# Patient Record
Sex: FEMALE | Race: WHITE | NOT HISPANIC OR LATINO | URBAN - METROPOLITAN AREA
[De-identification: names, ages, dates, MRNs, and addresses within clinical notes are randomized per-mention and may not be internally consistent; named-entity substitution may affect disease eponyms.]

---

## 2018-04-23 PROBLEM — J45.909 ASTHMA: Status: ACTIVE | Noted: 2017-02-01

## 2018-04-23 RX ORDER — SUMATRIPTAN SUCCINATE 50 MG/1
50 TABLET ORAL DAILY
COMMUNITY
Start: 2017-02-01 | End: 2022-08-30

## 2018-04-24 ENCOUNTER — OFFICE VISIT (OUTPATIENT)
Dept: FAMILY MEDICINE | Facility: CLINIC | Age: 28
End: 2018-04-24
Payer: COMMERCIAL

## 2018-04-24 VITALS
OXYGEN SATURATION: 99 % | SYSTOLIC BLOOD PRESSURE: 127 MMHG | TEMPERATURE: 98.5 F | WEIGHT: 197 LBS | HEART RATE: 91 BPM | BODY MASS INDEX: 33.63 KG/M2 | DIASTOLIC BLOOD PRESSURE: 93 MMHG | HEIGHT: 64 IN

## 2018-04-24 DIAGNOSIS — G43.909 MIGRAINE WITHOUT STATUS MIGRAINOSUS, NOT INTRACTABLE, UNSPECIFIED MIGRAINE TYPE: Primary | ICD-10-CM

## 2018-04-24 PROCEDURE — 99213 OFFICE O/P EST LOW 20 MIN: CPT | Performed by: NURSE PRACTITIONER

## 2018-04-24 ASSESSMENT — ENCOUNTER SYMPTOMS
LIGHT-HEADEDNESS: 0
DIZZINESS: 0
SEIZURES: 0
WEAKNESS: 0
NAUSEA: 0
SINUS PRESSURE: 0
ARTHRALGIAS: 0
MYALGIAS: 0
NERVOUS/ANXIOUS: 0
PHOTOPHOBIA: 0
EYE PAIN: 0
APPETITE CHANGE: 0
SINUS PAIN: 0
HEADACHES: 1
NUMBNESS: 0
VOMITING: 0
UNEXPECTED WEIGHT CHANGE: 0
SPEECH DIFFICULTY: 0
FEVER: 0
CONFUSION: 0
FATIGUE: 0
SLEEP DISTURBANCE: 0
JOINT SWELLING: 0

## 2018-04-24 NOTE — ASSESSMENT & PLAN NOTE
- Sent to Neurology for daily maintenance/preventive migraine medication. She will call to schedule.   - Adequate nutrition, exercise, hydration and sleep schedule.

## 2018-04-24 NOTE — PROGRESS NOTES
Subjective      Patient ID: Robyn Mariscal is a 27 y.o. female.    Pt presents with c/o migraine headaches. Was last seen over a year ago in the office 2017 for same complaint. She feels as if she has always suffered from migraines and headaches in general but for some reason over the last couple weeks to months they have progressively worsened, occurring more frequently. She was getting headaches once/week, now 4/7 days of the week. She has tried Imitrex but this medication makes her feel very funny, heart races. The only thing that does take the edge off somewhat is OTC Excedrine migraine product (but doesn't take the discomfort away completely, still lingers). When she does have a migraine +nausea, vomiting. No dizziness or any other neurologic deficit. She used to take Rx Topamax daily for years in high school and college but has been off that medication since having her 4 children. Eye exams UTD, no change in vision.          Past Medical History:   Diagnosis Date   • Anemia    • Asthma    • Migraines      Past Surgical History:   Procedure Laterality Date   •  SECTION       History reviewed. No pertinent family history.  Social History     Social History   • Marital status:      Spouse name: N/A   • Number of children: N/A   • Years of education: N/A     Occupational History   • Not on file.     Social History Main Topics   • Smoking status: Never Smoker   • Smokeless tobacco: Never Used   • Alcohol use Yes      Comment: occasionally   • Drug use: No   • Sexual activity: Not on file     Other Topics Concern   • Not on file     Social History Narrative    Do you wear your seatbelt? Yes    Do you have smoke detector in your home? Yes    Do you have a carbon monoxide detector in your home? Yes    Current Occupation?     Current Marital Status?            The following have been reviewed and updated as appropriate in this visit:  Tobacco  Allergies  Meds  Problems  Med Hx   "Surg Hx  Fam Hx  Soc Hx        Review of Systems   Constitutional: Negative for appetite change, fatigue, fever and unexpected weight change.   HENT: Negative for congestion, hearing loss, sinus pain and sinus pressure.    Eyes: Negative for photophobia, pain and visual disturbance.   Gastrointestinal: Negative for nausea and vomiting.   Genitourinary: Negative for menstrual problem.   Musculoskeletal: Negative for arthralgias, gait problem, joint swelling and myalgias.   Neurological: Positive for headaches. Negative for dizziness, seizures, syncope, speech difficulty, weakness, light-headedness and numbness.   Psychiatric/Behavioral: Negative for confusion and sleep disturbance. The patient is not nervous/anxious.        Objective     Vitals:    04/24/18 1214   BP: (!) 127/93   BP Location: Left upper arm   Patient Position: Sitting   Pulse: 91   Temp: 36.9 °C (98.5 °F)   TempSrc: Oral   SpO2: 99%   Weight: 89.4 kg (197 lb)   Height: 1.626 m (5' 4\")     Body mass index is 33.81 kg/m².    Physical Exam   Constitutional: She is oriented to person, place, and time. She appears well-developed and well-nourished. No distress.   HENT:   Head: Normocephalic and atraumatic.   Eyes: Conjunctivae and EOM are normal. Pupils are equal, round, and reactive to light.   Neurological: She is alert and oriented to person, place, and time. She displays normal reflexes. No cranial nerve deficit.   Skin: Skin is warm and dry. Capillary refill takes less than 2 seconds.   Psychiatric: She has a normal mood and affect. Her behavior is normal. Judgment and thought content normal.       Assessment/Plan   Problem List Items Addressed This Visit     Migraine without status migrainosus, not intractable - Primary     - Sent to Neurology for daily maintenance/preventive migraine medication. She will call to schedule.   - Adequate nutrition, exercise, hydration and sleep schedule.               "

## 2019-07-02 ENCOUNTER — OFFICE VISIT (OUTPATIENT)
Dept: FAMILY MEDICINE | Facility: CLINIC | Age: 29
End: 2019-07-02
Payer: COMMERCIAL

## 2019-07-02 VITALS
HEART RATE: 70 BPM | SYSTOLIC BLOOD PRESSURE: 123 MMHG | BODY MASS INDEX: 33.12 KG/M2 | OXYGEN SATURATION: 99 % | HEIGHT: 64 IN | TEMPERATURE: 98.5 F | DIASTOLIC BLOOD PRESSURE: 81 MMHG | WEIGHT: 194 LBS

## 2019-07-02 DIAGNOSIS — H92.01 RIGHT EAR PAIN: ICD-10-CM

## 2019-07-02 DIAGNOSIS — H91.91 HEARING LOSS OF RIGHT EAR, UNSPECIFIED HEARING LOSS TYPE: Primary | ICD-10-CM

## 2019-07-02 PROCEDURE — 99213 OFFICE O/P EST LOW 20 MIN: CPT | Performed by: NURSE PRACTITIONER

## 2019-07-02 RX ORDER — TOPIRAMATE 25 MG/1
25 TABLET ORAL 2 TIMES DAILY
COMMUNITY
End: 2023-04-04 | Stop reason: SDUPTHER

## 2019-07-02 RX ORDER — AMOXICILLIN AND CLAVULANATE POTASSIUM 875; 125 MG/1; MG/1
1 TABLET, FILM COATED ORAL 2 TIMES DAILY
Qty: 20 TABLET | Refills: 0 | Status: SHIPPED | OUTPATIENT
Start: 2019-07-02 | End: 2019-07-12

## 2019-07-02 ASSESSMENT — ENCOUNTER SYMPTOMS
CHILLS: 0
COUGH: 0
FATIGUE: 0
ACTIVITY CHANGE: 0
RHINORRHEA: 0
FEVER: 0
APPETITE CHANGE: 0
SINUS PRESSURE: 0
SORE THROAT: 0
HEADACHES: 0

## 2019-07-02 NOTE — PROGRESS NOTES
"   [place] [address]  [phone]       Reason for visit:   Chief Complaint   Patient presents with   • Earache / Otalgia      HPI   Robyn Mariscal is a 29 y.o. female who presents with hearingg loss, and discomfort right side. Started Friday - feels like she's \"under water\", muffled hearing. She does also note slight \"twinge of pain\" but hearing loss is bothering her more than the discomfort. She was battling with her typical allergy symptoms last week + runny nose, stuffy nose, dry cough. Those symptoms have resolved. No F/C/BA. She does not take anything allergies.          Past Medical History:   Diagnosis Date   • Anemia    • Asthma    • Migraines      Past Surgical History:   Procedure Laterality Date   •  SECTION       Social History     Social History   • Marital status:      Spouse name: N/A   • Number of children: N/A   • Years of education: N/A     Occupational History   • Not on file.     Social History Main Topics   • Smoking status: Never Smoker   • Smokeless tobacco: Never Used   • Alcohol use Yes      Comment: occasionally   • Drug use: No   • Sexual activity: Not on file     Other Topics Concern   • Not on file     Social History Narrative    Do you wear your seatbelt? Yes    Do you have smoke detector in your home? Yes    Do you have a carbon monoxide detector in your home? Yes    Current Occupation?     Current Marital Status?          History reviewed. No pertinent family history.  No known allergies  Current Outpatient Prescriptions   Medication Sig Dispense Refill   • mometasone-formoterol (DULERA) 100-5 mcg/actuation inhaler Inhale 2 puffs 2 (two) times a day.     • SUMAtriptan (IMITREX) 50 mg tablet Take 50 mg by mouth daily.     • topiramate (TOPAMAX) 25 mg tablet Take 25 mg by mouth 2 (two) times a day.     • amoxicillin-pot clavulanate (AUGMENTIN) 875-125 mg per tablet Take 1 " tablet by mouth 2 (two) times a day for 10 days. 20 tablet 0     No current facility-administered medications for this visit.        Review of Systems   Constitutional: Negative for activity change, appetite change, chills, fatigue and fever.   HENT: Positive for ear pain and hearing loss. Negative for congestion, ear discharge, postnasal drip, rhinorrhea, sinus pressure, sneezing and sore throat.    Eyes: Negative for visual disturbance.   Respiratory: Negative for cough.    Neurological: Negative for headaches.     Objective   Vitals:    07/02/19 1439   BP: 123/81   Pulse: 70   Temp: 36.9 °C (98.5 °F)   SpO2: 99%       Physical Exam   Constitutional: She is oriented to person, place, and time. Vital signs are normal. She appears well-developed and well-nourished. She is cooperative.  Non-toxic appearance. She does not have a sickly appearance. She does not appear ill. No distress.   HENT:   Head: Normocephalic and atraumatic.   Right Ear: Ear canal normal. No tenderness. Tympanic membrane is erythematous. Tympanic membrane is not perforated, not retracted and not bulging.   Left Ear: Hearing, tympanic membrane and ear canal normal. Tympanic membrane is not perforated, not erythematous, not retracted and not bulging.   Eyes: Pupils are equal, round, and reactive to light. Conjunctivae are normal.   Neck: Normal range of motion. Neck supple.   Lymphadenopathy:     She has no cervical adenopathy.   Neurological: She is alert and oriented to person, place, and time.   Skin: Skin is warm and dry.   Psychiatric: She has a normal mood and affect. Her behavior is normal. Judgment and thought content normal.   Nursing note and vitals reviewed.      Procedures        Assessment   Problem List Items Addressed This Visit     None      Visit Diagnoses     Hearing loss of right ear, unspecified hearing loss type    -  Primary    - Likely d/t inflammation, otitis media    Right ear pain        - Otitis media  - Rx Augmentin  -  Flonase OTC  - Heat, OTC Ibuprofen as needed  - Call or RTO PRN if symptoms fail to improve, resolve              DELROY Teran  7/2/2019

## 2019-08-01 RX ORDER — NORETHINDRONE 0.35 MG/1
0.35 TABLET ORAL DAILY
COMMUNITY
Start: 2017-01-23 | End: 2019-08-06 | Stop reason: ALTCHOICE

## 2019-08-06 ENCOUNTER — OFFICE VISIT (OUTPATIENT)
Dept: FAMILY MEDICINE | Facility: CLINIC | Age: 29
End: 2019-08-06
Payer: COMMERCIAL

## 2019-08-06 VITALS
SYSTOLIC BLOOD PRESSURE: 130 MMHG | WEIGHT: 193.2 LBS | OXYGEN SATURATION: 98 % | BODY MASS INDEX: 32.98 KG/M2 | TEMPERATURE: 98.2 F | HEIGHT: 64 IN | DIASTOLIC BLOOD PRESSURE: 93 MMHG | HEART RATE: 89 BPM

## 2019-08-06 DIAGNOSIS — Z00.00 ROUTINE PHYSICAL EXAMINATION: Primary | ICD-10-CM

## 2019-08-06 PROCEDURE — 99395 PREV VISIT EST AGE 18-39: CPT | Performed by: NURSE PRACTITIONER

## 2019-08-06 ASSESSMENT — ENCOUNTER SYMPTOMS
DECREASED CONCENTRATION: 0
BRUISES/BLEEDS EASILY: 0
ARTHRALGIAS: 0
TROUBLE SWALLOWING: 0
UNEXPECTED WEIGHT CHANGE: 0
CHILLS: 0
WHEEZING: 0
ADENOPATHY: 0
NERVOUS/ANXIOUS: 0
COLOR CHANGE: 0
SHORTNESS OF BREATH: 0
BLOOD IN STOOL: 0
FEVER: 0
CHEST TIGHTNESS: 0
COUGH: 0
CONFUSION: 0
VOICE CHANGE: 0
SLEEP DISTURBANCE: 0
WOUND: 0
VOMITING: 0
NECK STIFFNESS: 0
JOINT SWELLING: 0
DIAPHORESIS: 0
LIGHT-HEADEDNESS: 0
EYE PAIN: 0
DIFFICULTY URINATING: 0
WEAKNESS: 0
FLANK PAIN: 0
DIARRHEA: 0
APPETITE CHANGE: 0
PALPITATIONS: 0
DIZZINESS: 0
PHOTOPHOBIA: 0
FATIGUE: 0
TREMORS: 0
ABDOMINAL PAIN: 0
NUMBNESS: 0
BACK PAIN: 0
MYALGIAS: 0
NAUSEA: 0
HEADACHES: 0
NECK PAIN: 0
ACTIVITY CHANGE: 0
AGITATION: 0
CONSTIPATION: 0
HEMATURIA: 0

## 2019-08-06 NOTE — PROGRESS NOTES
[facility]  [address] [place] [address]  [place] D[place] [address]  [phone]       Reason for visit:   Chief Complaint   Patient presents with   • Annual Exam      HPI   Robyn Mariscal is a 29 y.o. female who presents for routine physical exam    PMHx/PSHx: Migraines, Asthma, Anemia  Specialists:               Neurology in [place] annually - migraines (on Tompamax)   Ophthalmology: annual eye exam   Gynecology: [place] annually    Complaints: Denies    Social  Diet: Generally healthy, more conscious  Exercise: Circuits, aerobic - 3 days/week started in May with group of son's friends' mothers, exercises while their children have practice  Sleep: No complaints  EtOH: Socially, < 5 drinks/week  Tobacco: Denies  Drugs: Denies  Work: Full-time   Lives with: , 4 children/boys (ages 12, 6, 4, 2)    FamHx: Pat Aunt (sudden death at 18 yo), x2 Pat 1st cousins - Long Qt syndrome  GynHx: +ovarian cyst ruptured requiring surgery about 10 yrs ago; Denies hx abnl pap  Menses - irregular cycles, very heavy bleeding x 4 days, tapers off  SA/Contraception - 1 partner/; none at this time, rhythm  OBHx: x4 uncomplicated pregnancies; Repeat c-sections    Pap: Within the last 3 years but really not sure (during last pregnancy?)  Immunizations: Tdap 2016, declines annual flu vax          Past Medical History:   Diagnosis Date   • Anemia    • Asthma    • Migraines      Past Surgical History:   Procedure Laterality Date   •  SECTION       Social History     Social History   • Marital status:      Spouse name: N/A   • Number of children: N/A   • Years of education: N/A     Occupational History   • Not on file.     Social History Main Topics   • Smoking status: Never Smoker   • Smokeless tobacco: Never Used   • Alcohol use Yes      Comment: occasionally   • Drug use: No   • Sexual activity: Not on file     Other Topics Concern   • Not on file     Social History  Narrative    Do you wear your seatbelt? Yes    Do you have smoke detector in your home? Yes    Do you have a carbon monoxide detector in your home? Yes    Current Occupation?     Current Marital Status?          History reviewed. No pertinent family history.  No known allergies  Current Outpatient Prescriptions   Medication Sig Dispense Refill   • SUMAtriptan (IMITREX) 50 mg tablet Take 50 mg by mouth daily.     • topiramate (TOPAMAX) 25 mg tablet Take 25 mg by mouth 2 (two) times a day.       No current facility-administered medications for this visit.        Review of Systems   Constitutional: Negative for activity change, appetite change, chills, diaphoresis, fatigue, fever and unexpected weight change.   HENT: Negative for dental problem, hearing loss, tinnitus, trouble swallowing and voice change.    Eyes: Negative for photophobia, pain and visual disturbance.   Respiratory: Negative for cough, chest tightness, shortness of breath and wheezing.    Cardiovascular: Negative for chest pain, palpitations and leg swelling.   Gastrointestinal: Negative for abdominal pain, blood in stool, constipation, diarrhea, nausea and vomiting.   Genitourinary: Negative for difficulty urinating, flank pain, genital sores and hematuria.   Musculoskeletal: Negative for arthralgias, back pain, gait problem, joint swelling, myalgias, neck pain and neck stiffness.   Skin: Negative for color change, rash and wound.   Neurological: Negative for dizziness, tremors, syncope, weakness, light-headedness, numbness and headaches.   Hematological: Negative for adenopathy. Does not bruise/bleed easily.   Psychiatric/Behavioral: Negative for agitation, confusion, decreased concentration, self-injury, sleep disturbance and suicidal ideas. The patient is not nervous/anxious.      Objective   Vitals:    08/06/19 1710   BP: (!) 130/93   Pulse: 89   Temp: 36.8 °C (98.2 °F)   SpO2: 98%       Physical Exam   Constitutional: She is  oriented to person, place, and time. Vital signs are normal. She appears well-developed and well-nourished. She is cooperative.  Non-toxic appearance. She does not have a sickly appearance. She does not appear ill. No distress.   HENT:   Head: Normocephalic and atraumatic.   Right Ear: Tympanic membrane, external ear and ear canal normal.   Left Ear: Tympanic membrane, external ear and ear canal normal.   Nose: Nose normal.   Mouth/Throat: Uvula is midline, oropharynx is clear and moist and mucous membranes are normal. Normal dentition.   Eyes: Pupils are equal, round, and reactive to light. Conjunctivae, EOM and lids are normal. Right eye exhibits no discharge. Left eye exhibits no discharge.   Neck: Normal range of motion and full passive range of motion without pain. Neck supple. No JVD present. Carotid bruit is not present. No thyromegaly present.   Cardiovascular: Normal rate, regular rhythm, normal heart sounds, intact distal pulses and normal pulses.  Exam reveals no gallop and no friction rub.    No murmur heard.  Pulmonary/Chest: Effort normal and breath sounds normal. No respiratory distress. She has no wheezes. She has no rales.   Abdominal: Soft. Normal appearance and bowel sounds are normal. She exhibits no distension. There is no tenderness. There is no CVA tenderness.   Musculoskeletal: Normal range of motion. She exhibits no edema, tenderness or deformity.   Lymphadenopathy:     She has no cervical adenopathy.   Neurological: She is alert and oriented to person, place, and time. She has normal reflexes. No cranial nerve deficit.   Skin: Skin is warm, dry and intact. Capillary refill takes less than 2 seconds. No rash noted.   Psychiatric: She has a normal mood and affect. Her speech is normal and behavior is normal. Judgment and thought content normal. Cognition and memory are normal.   Nursing note and vitals reviewed.      Procedures        Assessment   Problem List Items Addressed This Visit      None      Visit Diagnoses     Routine physical examination    -  Primary      - Counseled on general health maintenance & reviewed preventive screening guidelines. Pt had EKG to evaluate for prolonged Qt syndrome + family history  - VS normal, BMI: 33. Weight loss, counseled on healthy, well-balanced diet and exercise. Recommend Nutritionist/dietician. Discussed stress management and adequate sleep  - Continue regular follow up with GYN annually, probably due for pap. Will schedule annual. Discussed methods of contraception - pt considering IUD in the past but uncomfortable with the idea and not keen on any other form of hormonal birth control. Reviewed risks, benefits other alternatives. Pt to discuss with GYN. I did reiterate importance of contraception if genuinely not planning for 5th pregnancy.  - Recommend annual skin cancer screening by Dermatology.   - Recommend annual eye exams by Ophtho  - Routine labs, will send out when fasting --> [facility]  - Immunizations: UTD  - Recommend daily multivitamin, Vitamin D supplement. H/O anemia, particularly with pregnancies and given heavy menses -- recommend Iron supplement OTC 1 week prior and on week of menses.        EDLROY Teran  8/6/2019

## 2020-02-11 ENCOUNTER — PATIENT OUTREACH (OUTPATIENT)
Dept: FAMILY MEDICINE | Facility: CLINIC | Age: 30
End: 2020-02-11

## 2020-02-11 NOTE — PROGRESS NOTES
Care Gap Team has outreached to Robyn Mariscal on behalf of their primary care provider.      Care Gap Source:: [employer] [facility]         Care Gap Status:: Due    Outreach via:: Telephone    Adult Preventive Wellness Protocol(s) Used: : Cervical Cancer Screening    Chart Review Completed:: Yes  Patient interview completed:: No  Inclusion Criteria:: Met  Exclusion Criteria: None  Patient educated on recommended care:: No                 Appointment provided:: No         Called patient in regards to being due for a pap per [employer]. Left  Patient message asking her to call me back at her earliest convenience.

## 2020-08-21 ENCOUNTER — OFFICE VISIT (OUTPATIENT)
Dept: FAMILY MEDICINE | Facility: CLINIC | Age: 30
End: 2020-08-21
Payer: COMMERCIAL

## 2020-08-21 VITALS
WEIGHT: 158.8 LBS | HEIGHT: 64 IN | TEMPERATURE: 97.9 F | SYSTOLIC BLOOD PRESSURE: 116 MMHG | DIASTOLIC BLOOD PRESSURE: 70 MMHG | BODY MASS INDEX: 27.11 KG/M2 | HEART RATE: 83 BPM | OXYGEN SATURATION: 98 %

## 2020-08-21 DIAGNOSIS — Z00.00 ENCOUNTER FOR GENERAL ADULT MEDICAL EXAMINATION WITHOUT ABNORMAL FINDINGS: Primary | ICD-10-CM

## 2020-08-21 PROCEDURE — 90632 HEPA VACCINE ADULT IM: CPT | Performed by: FAMILY MEDICINE

## 2020-08-21 PROCEDURE — 99395 PREV VISIT EST AGE 18-39: CPT | Mod: 25 | Performed by: FAMILY MEDICINE

## 2020-08-21 PROCEDURE — 90471 IMMUNIZATION ADMIN: CPT | Performed by: FAMILY MEDICINE

## 2020-08-21 PROCEDURE — 36415 COLL VENOUS BLD VENIPUNCTURE: CPT | Performed by: FAMILY MEDICINE

## 2020-08-21 ASSESSMENT — ENCOUNTER SYMPTOMS
DYSURIA: 0
ADENOPATHY: 0
DIARRHEA: 0
ABDOMINAL PAIN: 0
WEAKNESS: 0
ARTHRALGIAS: 0
PALPITATIONS: 0
SHORTNESS OF BREATH: 0
DIZZINESS: 0
BLOOD IN STOOL: 0
RHINORRHEA: 0
COUGH: 0
NUMBNESS: 0
CHILLS: 0
FREQUENCY: 0
VOMITING: 0
SORE THROAT: 0
FEVER: 0
BACK PAIN: 0
CONSTIPATION: 0
NAUSEA: 0

## 2020-08-21 NOTE — ASSESSMENT & PLAN NOTE
Adult Female  Complaints - None  Diet - Intermittent Fasting; eating healthier  Activity - Cardio 3x/week  Tobacco Use - None  EtOH Use - Rare  Drug Use - None  Sexual Activity -   PMH - Asthma, migraines  FHX - MGM ovarian; MGF CAD  Labs Due - CBC CMP LP  Immunizations Due - Hep A #1  Preventative Care Due - GYN  Refused - None  Follow up - 1 year

## 2020-08-21 NOTE — PROGRESS NOTES
[facility]  [address] [place] [address]  [place] [address]  [phone]       Reason for visit:   Chief Complaint   Patient presents with   • Annual Exam      HPI   Robyn Mariscal is a 30 y.o. female who presents for her CPX   Updates No     Diet - Intermittent Fasting; Healthier eating  Exercise - 3x/week - Cardio    Smoke No   Alcohol Yes None in 3 mo  Drugs No     Lives with , 3 kids, 1 step son  Work  for [employer]  Pap Smear Due Yes   Ever Had Mammogram No     Hep A Due Yes   HPV Due No   TDAP Due No   Flu Due No   Lipids Due Yes   STDs Due No    Past Medical History:   Diagnosis Date   • Anemia    • Asthma    • Migraines      Past Surgical History:   Procedure Laterality Date   •  SECTION      x 3     Social History     Tobacco Use   • Smoking status: Never Smoker   • Smokeless tobacco: Never Used   Substance Use Topics   • Alcohol use: Yes     Comment: 0-1/week   • Drug use: No     Family History   Problem Relation Age of Onset   • Ovarian cancer Maternal Grandmother    • Heart disease Maternal Grandfather      No known allergies  Current Outpatient Medications   Medication Sig Dispense Refill   • SUMAtriptan (IMITREX) 50 mg tablet Take 50 mg by mouth daily.     • topiramate (TOPAMAX) 25 mg tablet Take 25 mg by mouth 2 (two) times a day.       No current facility-administered medications for this visit.        Review of Systems   Constitutional: Negative for chills and fever.   HENT: Negative for hearing loss, rhinorrhea and sore throat.    Eyes: Negative for visual disturbance.   Respiratory: Negative for cough and shortness of breath.    Cardiovascular: Negative for chest pain and palpitations.   Gastrointestinal: Negative for abdominal pain, blood in stool, constipation, diarrhea, nausea and vomiting.   Genitourinary: Negative for dysuria, frequency, vaginal bleeding and vaginal discharge.        No lump or bumps in breasts   Musculoskeletal:  "Negative for arthralgias and back pain.   Skin: Negative for rash.   Neurological: Negative for dizziness, weakness and numbness.   Hematological: Negative for adenopathy.     Objective   Vitals:    08/21/20 0833   BP: 116/70   BP Location: Right upper arm   Patient Position: Sitting   Pulse: 83   Temp: 36.6 °C (97.9 °F)   TempSrc: Temporal   SpO2: 98%   Weight: 72 kg (158 lb 12.8 oz)   Height: 1.626 m (5' 4\")       Physical Exam   Constitutional: She is oriented to person, place, and time. She appears well-developed and well-nourished.   HENT:   Head: Normocephalic and atraumatic.   Right Ear: External ear normal.   Left Ear: External ear normal.   Mouth/Throat: No oropharyngeal exudate.   Eyes: Pupils are equal, round, and reactive to light. Conjunctivae are normal.   Neck: Normal range of motion. Neck supple. No thyromegaly present.   Cardiovascular: Normal rate, regular rhythm, normal heart sounds and intact distal pulses.   Pulmonary/Chest: Effort normal and breath sounds normal. She has no wheezes. She has no rales.   Abdominal: Soft. Bowel sounds are normal. There is no tenderness. There is no rebound and no guarding.   Musculoskeletal: Normal range of motion. She exhibits no edema.   Lymphadenopathy:     She has no cervical adenopathy.   Neurological: She is alert and oriented to person, place, and time. No cranial nerve deficit.   Skin: Skin is warm. Capillary refill takes less than 2 seconds.   Psychiatric: She has a normal mood and affect. Her behavior is normal.   Vitals reviewed.      Procedures    Lab Results   Component Value Date    WBC 5.8 02/11/2016    HGB 10.3 (L) 02/11/2016    HCT 33.0 (L) 02/11/2016     02/11/2016         Assessment   Problem List Items Addressed This Visit        Other    Encounter for general adult medical examination without abnormal findings - Primary     Adult Female  Complaints - None  Diet - Intermittent Fasting; eating healthier  Activity - Cardio 3x/week  Tobacco " Use - None  EtOH Use - Rare  Drug Use - None  Sexual Activity -   PMH - Asthma, migraines  FHX - MGM ovarian; MGF CAD  Labs Due - CBC CMP LP  Immunizations Due - Hep A #1  Preventative Care Due - GYN  Refused - None  Follow up - 1 year         Relevant Orders    CBC and Differential    Comprehensive metabolic panel    Lipid panel    Hepatitis A vaccine adult IM (Completed)              Dillon Cutler MD  8/21/2020

## 2020-08-21 NOTE — PATIENT INSTRUCTIONS
Diet - Try to limit portion sizes, take out, fast food, processed foods. Alcohol can be a expensive source of calories! If these are current problems for you, pick one area and focus on that first! There is no such thing as a perfect diet. If you are trying to lose weight, it will be difficult to do with foods you do not enjoy.  You may need to try a few different things before finding something.  In general, the diet with the best evidence is the Mediterranean diet. If you have high blood pressure, the DASH diet has good evidence to lower weight and BP.    Exercise - Goal should be 30-40 minutes, 3-4 times a week. If you are currently not exercising, set reachable goals with short term deadlines! The same goes with diet - you are less likely to be successful if you are doing something you don't enjoy.  Weights - even 1-2 pounds! - are great to strengthen bones in old adults.      Preventative Care Due - GYN    Labs Due Today - Yes    Shots Due Today - Hep A #1    Follow up - 1 year or sooner if anything comes up. We keep same-day sick appointments available every day for last minute problems.  If it is during the week - call us! Often times we can prevent an ER or UC visit! For certain problems, a telemedicine visit can be a great way to see me without having to come into the office or go to an UC!    If you have any specialists such as a Cardiologist, Gastroenterologist for a chronic problem, make sure you are following up with them as recommended!  Diabetics should be having a yearly eye exam by a Optometrist/Ophthalmologist and a foot exam by a Podiatrist!  Women should see their GYN yearly - though you may not need a pap smear done at each visit.  Those with a family history of skin cancer should see a Dermatologist annually.

## 2020-08-22 LAB
ALBUMIN SERPL-MCNC: 4.5 G/DL (ref 3.6–5.1)
ALBUMIN/GLOB SERPL: 1.7 (CALC) (ref 1–2.5)
ALP SERPL-CCNC: 62 U/L (ref 31–125)
ALT SERPL-CCNC: 16 U/L (ref 6–29)
AST SERPL-CCNC: 15 U/L (ref 10–30)
BASOPHILS # BLD AUTO: 29 CELLS/UL (ref 0–200)
BASOPHILS NFR BLD AUTO: 0.6 %
BILIRUB SERPL-MCNC: 0.5 MG/DL (ref 0.2–1.2)
BUN SERPL-MCNC: 12 MG/DL (ref 7–25)
BUN/CREAT SERPL: 10 (CALC) (ref 6–22)
CALCIUM SERPL-MCNC: 9.5 MG/DL (ref 8.6–10.2)
CHLORIDE SERPL-SCNC: 106 MMOL/L (ref 98–110)
CHOLEST SERPL-MCNC: 186 MG/DL
CHOLEST/HDLC SERPL: 4.8 (CALC)
CO2 SERPL-SCNC: 26 MMOL/L (ref 20–32)
CREAT SERPL-MCNC: 1.17 MG/DL (ref 0.5–1.1)
EOSINOPHIL # BLD AUTO: 48 CELLS/UL (ref 15–500)
EOSINOPHIL NFR BLD AUTO: 1 %
ERYTHROCYTE [DISTWIDTH] IN BLOOD BY AUTOMATED COUNT: 13.5 % (ref 11–15)
GLOBULIN SER CALC-MCNC: 2.6 G/DL (CALC) (ref 1.9–3.7)
GLUCOSE SERPL-MCNC: 85 MG/DL (ref 65–99)
HCT VFR BLD AUTO: 40 % (ref 35–45)
HDLC SERPL-MCNC: 39 MG/DL
HGB BLD-MCNC: 12.8 G/DL (ref 11.7–15.5)
LDLC SERPL CALC-MCNC: 126 MG/DL (CALC)
LYMPHOCYTES # BLD AUTO: 1358 CELLS/UL (ref 850–3900)
LYMPHOCYTES NFR BLD AUTO: 28.3 %
MCH RBC QN AUTO: 28.9 PG (ref 27–33)
MCHC RBC AUTO-ENTMCNC: 32 G/DL (ref 32–36)
MCV RBC AUTO: 90.3 FL (ref 80–100)
MONOCYTES # BLD AUTO: 312 CELLS/UL (ref 200–950)
MONOCYTES NFR BLD AUTO: 6.5 %
NEUTROPHILS # BLD AUTO: 3053 CELLS/UL (ref 1500–7800)
NEUTROPHILS NFR BLD AUTO: 63.6 %
NONHDLC SERPL-MCNC: 147 MG/DL (CALC)
PLATELET # BLD AUTO: 314 THOUSAND/UL (ref 140–400)
PMV BLD REES-ECKER: 10 FL (ref 7.5–12.5)
POTASSIUM SERPL-SCNC: 4.3 MMOL/L (ref 3.5–5.3)
PROT SERPL-MCNC: 7.1 G/DL (ref 6.1–8.1)
QUEST EGFR NON-AFR. AMERICAN: 62 ML/MIN/1.73M2
RBC # BLD AUTO: 4.43 MILLION/UL (ref 3.8–5.1)
SODIUM SERPL-SCNC: 139 MMOL/L (ref 135–146)
TRIGL SERPL-MCNC: 100 MG/DL
WBC # BLD AUTO: 4.8 THOUSAND/UL (ref 3.8–10.8)

## 2020-09-09 ENCOUNTER — APPOINTMENT (RX ONLY)
Dept: URBAN - METROPOLITAN AREA CLINIC 374 | Facility: CLINIC | Age: 30
Setting detail: DERMATOLOGY
End: 2020-09-09

## 2020-09-09 DIAGNOSIS — L72.8 OTHER FOLLICULAR CYSTS OF THE SKIN AND SUBCUTANEOUS TISSUE: ICD-10-CM

## 2020-09-09 PROCEDURE — ? DEFER

## 2020-09-09 PROCEDURE — ? PRESCRIPTION MEDICATION MANAGEMENT

## 2020-09-09 PROCEDURE — 11900 INJECT SKIN LESIONS </W 7: CPT

## 2020-09-09 PROCEDURE — ? PRESCRIPTION

## 2020-09-09 PROCEDURE — ? INTRALESIONAL KENALOG

## 2020-09-09 RX ORDER — DOXYCYCLINE 100 MG/1
CAPSULE ORAL QDAY
Qty: 14 | Refills: 0 | Status: ERX | COMMUNITY
Start: 2020-09-09

## 2020-09-09 RX ADMIN — DOXYCYCLINE: 100 CAPSULE ORAL at 00:00

## 2020-09-09 ASSESSMENT — LOCATION DETAILED DESCRIPTION DERM
LOCATION DETAILED: RIGHT SUPERIOR MEDIAL MIDBACK
LOCATION DETAILED: LEFT SUPERIOR MEDIAL MIDBACK

## 2020-09-09 ASSESSMENT — LOCATION ZONE DERM: LOCATION ZONE: TRUNK

## 2020-09-09 ASSESSMENT — LOCATION SIMPLE DESCRIPTION DERM
LOCATION SIMPLE: RIGHT LOWER BACK
LOCATION SIMPLE: LEFT LOWER BACK

## 2020-09-09 NOTE — PROCEDURE: PRESCRIPTION MEDICATION MANAGEMENT
Detail Level: Zone
Initiate Treatment: doxycycline monohydrate 100 mg capsule:Take one pill po qday with food
Render In Strict Bullet Format?: No

## 2020-09-09 NOTE — PROCEDURE: INTRALESIONAL KENALOG
Include Z78.9 (Other Specified Conditions Influencing Health Status) As An Associated Diagnosis?: No
Detail Level: Simple
Concentration Of Solution Injected (Mg/Ml): 5.0
Total Volume Injected (Ccs- Only Use Numbers And Decimals): .6
Medical Necessity Clause: This procedure was medically necessary because the lesions that were treated were:
Kenalog Preparation: Kenalog
Administered By (Optional): JUAQUIN
Consent: The risks of atrophy were reviewed with the patient.
X Size Of Lesion In Cm (Optional): 0

## 2020-11-06 ENCOUNTER — PATIENT OUTREACH (OUTPATIENT)
Dept: FAMILY MEDICINE | Facility: CLINIC | Age: 30
End: 2020-11-06

## 2020-11-06 NOTE — PROGRESS NOTES
Care Gap Team has outreached to Robyn Mariscal on behalf of their primary care provider.      Care Gap Source:: [facility] [employer]         Care Gap Status:: Due    Outreach via:: Telephone    Adult Preventive Wellness Protocol(s) Used: : Cervical Cancer Screening    Chart Review Completed:: Yes  Patient interview completed:: No  Inclusion Criteria:: Met  Exclusion Criteria: None  Patient educated on recommended care:: No                 Appointment provided:: No             Called and left VM for patient letting them know that they are due for their cervical cancer screening.  Asked patient to return my call.

## 2021-02-09 ENCOUNTER — TELEPHONE (OUTPATIENT)
Dept: FAMILY MEDICINE | Facility: CLINIC | Age: 31
End: 2021-02-09

## 2021-02-09 NOTE — LETTER
February 10, 2021     Patient: Robyn Mariscal  YOB: 1990  Date of Visit: 2/9/2021    To Whom it May Concern:    Robyn Mariscal is okay to return to work on 2/15/21 following her COVID infection.      If you have any questions or concerns, please don't hesitate to call.         Sincerely,   .        Dillon Cutler MD        CC: No Recipients

## 2021-02-10 PROBLEM — U07.1 COVID-19: Status: ACTIVE | Noted: 2021-02-10

## 2021-02-10 NOTE — TELEPHONE ENCOUNTER
Spoke to patient about her COVID infection. Symptoms started 2/2.  Positive on 2/5. Last fever on 2/7.  Feeling OK today, just tired.  No SOB, cough.    OK to return to work on 2/15. Please provide letter for patient.

## 2021-04-19 ENCOUNTER — OFFICE VISIT (OUTPATIENT)
Dept: FAMILY MEDICINE | Facility: CLINIC | Age: 31
End: 2021-04-19
Payer: COMMERCIAL

## 2021-04-19 VITALS
HEIGHT: 64 IN | DIASTOLIC BLOOD PRESSURE: 85 MMHG | OXYGEN SATURATION: 99 % | SYSTOLIC BLOOD PRESSURE: 141 MMHG | TEMPERATURE: 96.9 F | WEIGHT: 164 LBS | BODY MASS INDEX: 28 KG/M2 | HEART RATE: 97 BPM

## 2021-04-19 DIAGNOSIS — J45.30 MILD PERSISTENT ASTHMA WITHOUT COMPLICATION: ICD-10-CM

## 2021-04-19 DIAGNOSIS — F50.89 PICA: Primary | ICD-10-CM

## 2021-04-19 PROCEDURE — 90632 HEPA VACCINE ADULT IM: CPT | Performed by: FAMILY MEDICINE

## 2021-04-19 PROCEDURE — 3008F BODY MASS INDEX DOCD: CPT | Performed by: FAMILY MEDICINE

## 2021-04-19 PROCEDURE — 90471 IMMUNIZATION ADMIN: CPT | Performed by: FAMILY MEDICINE

## 2021-04-19 PROCEDURE — 99213 OFFICE O/P EST LOW 20 MIN: CPT | Mod: 25 | Performed by: FAMILY MEDICINE

## 2021-04-19 PROCEDURE — 36415 COLL VENOUS BLD VENIPUNCTURE: CPT | Performed by: FAMILY MEDICINE

## 2021-04-19 RX ORDER — BUDESONIDE AND FORMOTEROL FUMARATE DIHYDRATE 160; 4.5 UG/1; UG/1
2 AEROSOL RESPIRATORY (INHALATION) 2 TIMES DAILY
COMMUNITY
End: 2022-09-29 | Stop reason: ALTCHOICE

## 2021-04-19 ASSESSMENT — ENCOUNTER SYMPTOMS
ARTHRALGIAS: 1
FATIGUE: 1
WEAKNESS: 0
NUMBNESS: 0
APPETITE CHANGE: 0

## 2021-04-19 NOTE — PROGRESS NOTES
[place] [id number]  [place] [address]  [phone]       Reason for visit:   Chief Complaint   Patient presents with   • Illness      HPI   Robyn Mariscal is a 30 y.o. female who presents with cravings   - Cravings  Craving ice for over a week  Has had this in the past when she had a low iron  Had COVID #2 last week  Noticing aching in her knees, hands  Better today than yesterday  Feels tired  Hb 12.8   FDLMP 21 5 days, heavy     Past Medical History:   Diagnosis Date   • Anemia    • Asthma    • COVID-19 2/10/2021   • Migraines      Past Surgical History:   Procedure Laterality Date   •  SECTION      x 3     Social History     Socioeconomic History   • Marital status:      Spouse name: Not on file   • Number of children: Not on file   • Years of education: Not on file   • Highest education level: Not on file   Occupational History   • Not on file   Tobacco Use   • Smoking status: Never Smoker   • Smokeless tobacco: Never Used   Substance and Sexual Activity   • Alcohol use: Yes     Comment: 0-1/week   • Drug use: No   • Sexual activity: Yes     Partners: Male   Other Topics Concern   • Not on file   Social History Narrative    Do you wear your seatbelt? Yes    Do you have smoke detector in your home? Yes    Do you have a carbon monoxide detector in your home? Yes    Current Occupation?     Current Marital Status?      Social Determinants of Health     Financial Resource Strain:    • Difficulty of Paying Living Expenses:    Food Insecurity:    • Worried About Running Out of Food in the Last Year:    • Ran Out of Food in the Last Year:    Transportation Needs:    • Lack of Transportation (Medical):    • Lack of Transportation (Non-Medical):    Physical Activity:    • Days of Exercise per Week:    • Minutes of Exercise per Session:    Stress:    • Feeling of Stress :    Social Connections:    • Frequency of  "Communication with Friends and Family:    • Frequency of Social Gatherings with Friends and Family:    • Attends [employer] Services:    • Active Member of Clubs or Organizations:    • Attends Club or Organization Meetings:    • Marital Status:    Intimate Partner Violence:    • Fear of Current or Ex-Partner:    • Emotionally Abused:    • Physically Abused:    • Sexually Abused:      Family History   Problem Relation Age of Onset   • Ovarian cancer Maternal Grandmother    • Heart disease Maternal Grandfather      No known allergies  Current Outpatient Medications   Medication Sig Dispense Refill   • budesonide-formoteroL (SYMBICORT) 160-4.5 mcg/actuation inhaler Inhale 2 puffs 2 (two) times a day.   Rinse mouth with water after use to reduce aftertaste and incidence of candidiasis.   Do not swallow.  For patients not on a ventilator, a spacer is recommended to be used with this medication/inhaler.     • SUMAtriptan (IMITREX) 50 mg tablet Take 50 mg by mouth daily.     • topiramate (TOPAMAX) 25 mg tablet Take 25 mg by mouth 2 (two) times a day.       No current facility-administered medications for this visit.       Review of Systems   Constitutional: Positive for fatigue. Negative for appetite change.   Musculoskeletal: Positive for arthralgias.   Neurological: Negative for weakness and numbness.     Objective   Vitals:    04/19/21 1412   BP: (!) 141/85   BP Location: Left upper arm   Patient Position: Sitting   Pulse: 97   Temp: (!) 36.1 °C (96.9 °F)   TempSrc: Temporal   SpO2: 99%   Weight: 74.4 kg (164 lb)   Height: 1.626 m (5' 4\")       Physical Exam  Constitutional:       General: She is not in acute distress.  Eyes:      General: No scleral icterus.     Conjunctiva/sclera: Conjunctivae normal.   Cardiovascular:      Pulses: Normal pulses.   Skin:     Capillary Refill: Capillary refill takes less than 2 seconds.   Neurological:      General: No focal deficit present.      Mental Status: She is alert and oriented " to person, place, and time.         Procedures    Lab Results   Component Value Date    WBC 4.8 08/21/2020    HGB 12.8 08/21/2020    HCT 40.0 08/21/2020     08/21/2020    CHOL 186 08/21/2020    TRIG 100 08/21/2020    HDL 39 (L) 08/21/2020    ALT 16 08/21/2020    AST 15 08/21/2020     08/21/2020    K 4.3 08/21/2020     08/21/2020    CREATININE 1.17 (H) 08/21/2020    BUN 12 08/21/2020    CO2 26 08/21/2020         Assessment   Problem List Items Addressed This Visit        Respiratory    Asthma    Relevant Medications    budesonide-formoteroL (SYMBICORT) 160-4.5 mcg/actuation inhaler      Other Visit Diagnoses     Pica    -  Primary    New Problem  Craving ice  x 1-2 w  Heavy cycle 2 weeks ago  Hb was OK 8/20  Has had this in past with low Hb and iron  CBC, iron    Relevant Orders    CBC and Differential    Iron and TIBC              Dillon Cutler MD  4/19/2021

## 2021-04-20 LAB
BASOPHILS # BLD AUTO: 42 CELLS/UL (ref 0–200)
BASOPHILS NFR BLD AUTO: 0.8 %
EOSINOPHIL # BLD AUTO: 80 CELLS/UL (ref 15–500)
EOSINOPHIL NFR BLD AUTO: 1.5 %
ERYTHROCYTE [DISTWIDTH] IN BLOOD BY AUTOMATED COUNT: 13.3 % (ref 11–15)
HCT VFR BLD AUTO: 36 % (ref 35–45)
HGB BLD-MCNC: 11.6 G/DL (ref 11.7–15.5)
IRON SATN MFR SERPL: 7 % (CALC) (ref 16–45)
IRON SERPL-MCNC: 27 MCG/DL (ref 40–190)
LYMPHOCYTES # BLD AUTO: 1638 CELLS/UL (ref 850–3900)
LYMPHOCYTES NFR BLD AUTO: 30.9 %
MCH RBC QN AUTO: 27.9 PG (ref 27–33)
MCHC RBC AUTO-ENTMCNC: 32.2 G/DL (ref 32–36)
MCV RBC AUTO: 86.5 FL (ref 80–100)
MONOCYTES # BLD AUTO: 260 CELLS/UL (ref 200–950)
MONOCYTES NFR BLD AUTO: 4.9 %
NEUTROPHILS # BLD AUTO: 3281 CELLS/UL (ref 1500–7800)
NEUTROPHILS NFR BLD AUTO: 61.9 %
PLATELET # BLD AUTO: 372 THOUSAND/UL (ref 140–400)
PMV BLD REES-ECKER: 10.6 FL (ref 7.5–12.5)
RBC # BLD AUTO: 4.16 MILLION/UL (ref 3.8–5.1)
TIBC SERPL-MCNC: 364 MCG/DL (CALC) (ref 250–450)
WBC # BLD AUTO: 5.3 THOUSAND/UL (ref 3.8–10.8)

## 2022-07-29 ENCOUNTER — HOSPITAL ENCOUNTER (EMERGENCY)
Facility: HOSPITAL | Age: 32
Discharge: HOME | End: 2022-07-29
Attending: EMERGENCY MEDICINE
Payer: COMMERCIAL

## 2022-07-29 ENCOUNTER — APPOINTMENT (EMERGENCY)
Dept: RADIOLOGY | Facility: HOSPITAL | Age: 32
End: 2022-07-29
Attending: EMERGENCY MEDICINE
Payer: COMMERCIAL

## 2022-07-29 VITALS
OXYGEN SATURATION: 100 % | SYSTOLIC BLOOD PRESSURE: 139 MMHG | BODY MASS INDEX: 30.39 KG/M2 | HEART RATE: 78 BPM | TEMPERATURE: 98.1 F | DIASTOLIC BLOOD PRESSURE: 84 MMHG | RESPIRATION RATE: 15 BRPM | HEIGHT: 64 IN | WEIGHT: 178 LBS

## 2022-07-29 DIAGNOSIS — S80.12XA CONTUSION OF LEFT LOWER LEG, INITIAL ENCOUNTER: Primary | ICD-10-CM

## 2022-07-29 PROCEDURE — 93971 EXTREMITY STUDY: CPT | Mod: LT

## 2022-07-29 PROCEDURE — 99281 EMR DPT VST MAYX REQ PHY/QHP: CPT

## 2022-07-29 PROCEDURE — 99283 EMERGENCY DEPT VISIT LOW MDM: CPT | Mod: 25

## 2022-07-29 ASSESSMENT — ENCOUNTER SYMPTOMS
MYALGIAS: 1
NECK PAIN: 0
BACK PAIN: 0
FEVER: 0
ABDOMINAL PAIN: 0
JOINT SWELLING: 0
SHORTNESS OF BREATH: 0
NUMBNESS: 0
CHILLS: 0
FLANK PAIN: 0
DIZZINESS: 0
COLOR CHANGE: 1
HEADACHES: 0
WEAKNESS: 0
FATIGUE: 0

## 2022-07-29 NOTE — ED ATTESTATION NOTE
I have personally seen and examined the patient.  I reviewed and agree with physician assistant / nurse practitioner’s assessment and plan of care.     Exam: Evaluation of the patient's left lower extremity reveals a large hematoma with ecchymosis overlying the gastroc.  There is no asymmetry of the size of the legs left compared to right.  The left lower extremity is neurovascularly intact.  Patient is otherwise atraumatic.    Plan: Patient was sent in by urgent care after negative x-ray with concerns for possible DVT.  Will arrange for ultrasound.  Patient was offered pain medications but declined at this time           Alfred Tanner DO  07/29/22 1536

## 2022-07-29 NOTE — ED PROVIDER NOTES
Emergency Medicine Note  HPI   HISTORY OF PRESENT ILLNESS     Robyn Mariscal is a 32 y.o. female with PMHx of anemia, asthma presenting to the emergency department for evaluation of left lower leg injury.  Patient reports 6 days ago she was sitting on the back of a chair and it fell hitting the back of her left calf.  She reports there was some bruising behind her calf shortly after but reports the pain has now increased. She describes the pain as burning located to her left calf radiating into her knee. She states she went to urgent care today and had a tibia/fibula xray which was normal w/o fracture. They recommended she come to the ED for an ultrasound. Pt states she was taking motrin for pain but has not taken anything today. She denies SOB, leg swelling, chest pain, cough, fevers.             Patient History   PAST HISTORY     Reviewed from Nursing Triage:         Past Medical History:   Diagnosis Date   • Anemia    • Asthma    • COVID-19 2/10/2021   • Migraines        Past Surgical History:   Procedure Laterality Date   •  SECTION      x 3   • OVARIAN CYST REMOVAL         Family History   Problem Relation Age of Onset   • Ovarian cancer Maternal Grandmother    • Heart disease Maternal Grandfather        Social History     Tobacco Use   • Smoking status: Never Smoker   • Smokeless tobacco: Never Used   Substance Use Topics   • Alcohol use: Yes     Comment: 0-1/week   • Drug use: No         Review of Systems   REVIEW OF SYSTEMS     Review of Systems   Constitutional: Negative for chills, fatigue and fever.   Respiratory: Negative for shortness of breath.    Cardiovascular: Negative for chest pain and leg swelling.   Gastrointestinal: Negative for abdominal pain.   Genitourinary: Negative for flank pain.   Musculoskeletal: Positive for myalgias (left lower leg). Negative for back pain, gait problem, joint swelling and neck pain.   Skin: Positive for color change (bruising behind left calf 10 cm x 4 cm  ).   Neurological: Negative for dizziness, syncope, weakness, numbness and headaches.         VITALS     ED Vitals    Date/Time Temp Pulse Resp BP SpO2 Who   07/29/22 1511 36.7 °C (98.1 °F) 78 15 139/84 100 % BM                       Physical Exam   PHYSICAL EXAM     Physical Exam  Constitutional:       General: She is not in acute distress.  HENT:      Head: Normocephalic and atraumatic.   Cardiovascular:      Rate and Rhythm: Normal rate and regular rhythm.      Pulses: Normal pulses.      Heart sounds: Normal heart sounds.   Pulmonary:      Effort: Pulmonary effort is normal.      Breath sounds: Normal breath sounds.   Abdominal:      General: Abdomen is flat.      Palpations: Abdomen is soft.      Tenderness: There is no abdominal tenderness.   Musculoskeletal:         General: Normal range of motion.      Cervical back: Normal range of motion and neck supple.      Left knee: Normal.      Right lower leg: No edema.      Left lower leg: Tenderness present. No swelling. No edema.      Left ankle: Normal.      Left Achilles Tendon: Normal.      Left foot: Normal. Normal capillary refill. Normal pulse.      Comments: Neurovascularly intact   Skin:     General: Skin is warm and dry.      Capillary Refill: Capillary refill takes less than 2 seconds.      Findings: Bruising (to left lower leg) present.   Neurological:      General: No focal deficit present.      Mental Status: She is alert and oriented to person, place, and time.   Psychiatric:         Mood and Affect: Mood normal.         Behavior: Behavior normal.           PROCEDURES     Procedures     DATA     Results     None          Imaging Results          US venous leg, LL extremity (Final result)  Result time 07/29/22 16:13:08    Final result                 Impression:    IMPRESSION:  No evidence for left -sided deep venous thrombosis.             Narrative:    CLINICAL HISTORY: calf pain    COMMENT: Ultrasound examination of the left lower extremity venous  system is  performed utilizing gray scale, color, and pulsed Doppler sonography.    Comparison: There are no prior examinations available for comparison.    There is normal response to compression within the left common femoral,  superficial femoral, and popliteal veins. Normal color-flow, venous waveforms,  and response to augmentation is identified.  The peroneal and posterior tibial  vein were also examined in the calf and where visualized are free of thrombus.                                No orders to display       Scoring tools                                 ED Course & MDM   MDM / ED COURSE / CLINICAL IMPRESSIONS / DISPO     MDM    ED Course as of 07/30/22 0221 Fri Jul 29, 2022   1530 Pt brought paperwork from urgent care. Tib-fib x-ray from urgent care showed no fracture or acute disease. [VL]   1615 US venous leg, LL extremity  IMPRESSION:  No evidence for left -sided deep venous thrombosis. [VL]   1630 Patient of lab and imaging results, to have repeat ultrasound done in 1 week if symptoms continue, to take Tylenol/NSAIDs for pain as directed on the bottle, to ice leg, and to return for new or worsening symptoms. [VL]      ED Course User Index  [VL] Cheryl Gallo PA C     Impressions  Contusion of left lower leg, initial encounter    Discharge         Cheryl Gallo PA C  07/30/22 0221

## 2022-08-30 ENCOUNTER — OFFICE VISIT (OUTPATIENT)
Dept: FAMILY MEDICINE | Facility: CLINIC | Age: 32
End: 2022-08-30
Payer: COMMERCIAL

## 2022-08-30 VITALS
WEIGHT: 182.4 LBS | HEIGHT: 64 IN | TEMPERATURE: 97.8 F | OXYGEN SATURATION: 99 % | BODY MASS INDEX: 31.14 KG/M2 | HEART RATE: 70 BPM | SYSTOLIC BLOOD PRESSURE: 118 MMHG | DIASTOLIC BLOOD PRESSURE: 84 MMHG

## 2022-08-30 DIAGNOSIS — F41.9 ANXIETY: Primary | ICD-10-CM

## 2022-08-30 DIAGNOSIS — J45.30 MILD PERSISTENT ASTHMA WITHOUT COMPLICATION: ICD-10-CM

## 2022-08-30 PROCEDURE — 99213 OFFICE O/P EST LOW 20 MIN: CPT

## 2022-08-30 PROCEDURE — 3008F BODY MASS INDEX DOCD: CPT

## 2022-08-30 RX ORDER — ESCITALOPRAM OXALATE 10 MG/1
10 TABLET ORAL DAILY
Qty: 30 TABLET | Refills: 0 | Status: SHIPPED | OUTPATIENT
Start: 2022-08-30 | End: 2022-09-29 | Stop reason: SDUPTHER

## 2022-08-30 ASSESSMENT — ENCOUNTER SYMPTOMS
MUSCULOSKELETAL NEGATIVE: 1
RESPIRATORY NEGATIVE: 1
NERVOUS/ANXIOUS: 1
CARDIOVASCULAR NEGATIVE: 1
CONSTITUTIONAL NEGATIVE: 1
DYSPHORIC MOOD: 0
GASTROINTESTINAL NEGATIVE: 1
EYES NEGATIVE: 1
NEUROLOGICAL NEGATIVE: 1

## 2022-08-30 ASSESSMENT — PATIENT HEALTH QUESTIONNAIRE - PHQ9: SUM OF ALL RESPONSES TO PHQ9 QUESTIONS 1 & 2: 0

## 2022-08-30 NOTE — PATIENT INSTRUCTIONS
Start taking Lexapro - you will take 1/2 tablet daily for 1 week, then take full tablet daily.    Follow up with me in 1 month.

## 2022-08-30 NOTE — ASSESSMENT & PLAN NOTE
Increase in symptoms over last 2 years, incessant worry  GAD7 score 13, PHQ9 0  Has tried to manage symptoms on her own, interested in medication and therapy  Start Lexapro 10 mg daily - Instructed pt to take 1/2 tablet daily x 1 week, then take 1 tablet daily  Educated on medication and side effects  Establish with therapy - with our office or other provider  Provided education on anxiety management  Follow up 1 month

## 2022-08-30 NOTE — PROGRESS NOTES
[facility]      Reason for visit:   Chief Complaint   Patient presents with   • Anxiety      Robyn Mariscal is a 32 y.o. female who presents for anxiety.    HPI   Anxiety  Always worrying - About kids, other things out of her control  Feels can't control it anymore  Has always felt she is a high strung or anxious person  Symptoms worsened in last 2 years  Has had panic attacks, able to calm herself down  Never diagnosed     Can feel symptoms in stomach, diarrhea  Sometimes chest pains, very brief - takes deep breaths and helps  She has been trying to manage/cope on her own - using deep breathing, lavender essential oil, takes shower in dark to help calm down    Sleep great  Appetite good     Work- income maintenance , works from home  Lives with , 4 boys ages 15, 9 , 7, 5   very supportive    Follows with Neurology - Dr. Maria for migraines  Taking topamax daily      Past Medical History:   Diagnosis Date   • Anemia    • Asthma    • COVID-19 2/10/2021   • Migraines        Past Surgical History:   Procedure Laterality Date   •  SECTION      x 3   • OVARIAN CYST REMOVAL         Social History     Tobacco Use   • Smoking status: Never Smoker   • Smokeless tobacco: Never Used   Substance Use Topics   • Alcohol use: Yes     Comment: 0-1/week   • Drug use: No       Family History   Problem Relation Age of Onset   • Ovarian cancer Maternal Grandmother    • Heart disease Maternal Grandfather        No known allergies    Current Outpatient Medications on File Prior to Visit   Medication Sig Dispense Refill   • topiramate (TOPAMAX) 25 mg tablet Take 25 mg by mouth 2 (two) times a day.     • budesonide-formoteroL (SYMBICORT) 160-4.5 mcg/actuation inhaler Inhale 2 puffs 2 (two) times a day.   Rinse mouth with water after use to reduce aftertaste and incidence of candidiasis.   Do not swallow.  For patients not on a ventilator, a spacer is recommended to be used with this medication/inhaler.    "    No current facility-administered medications on file prior to visit.       Review of Systems   Constitutional: Negative.    HENT: Negative.    Eyes: Negative.    Respiratory: Negative.    Cardiovascular: Negative.    Gastrointestinal: Negative.    Musculoskeletal: Negative.    Neurological: Negative.    Psychiatric/Behavioral: Negative for dysphoric mood and suicidal ideas. The patient is nervous/anxious.      ADRI-7  Feeling nervous, anxious or on edge: 3-->Nearly every day    Not being able to stop or control worrying: 3-->Nearly every day    Worrying too much about different things: 3-->Nearly every day    Trouble relaxin-->Several days    Being so restless that it is hard to sit still: 1-->Several days    Becoming easily annoyed or irritable: 1-->Several days    Feeling afraid as if something awful might happen: 1-->Several days      GAD7 Total Score: : 13      If you checked off any problems, how difficult have these made it for you to do your work, take care of things at home, or get along with other people?: Not difficult at all      PHQ 2 to 9:  Will the patient answer the depression questions?: Yes    Little interest or pleasure in doing things: Not at all    Feeling down, depressed, or hopeless: Not at all    Depression Risk: 0        Objective   Vitals:    22 0757   BP: 118/84   BP Location: Right upper arm   Patient Position: Sitting   Pulse: 70   Temp: 36.6 °C (97.8 °F)   TempSrc: Oral   SpO2: 99%   Weight: 82.7 kg (182 lb 6.4 oz)   Height: 1.626 m (5' 4\")     Body mass index is 31.31 kg/m².    Physical Exam  Constitutional:       General: She is not in acute distress.     Appearance: Normal appearance. She is not ill-appearing.   HENT:      Head: Normocephalic.   Neck:      Thyroid: No thyromegaly.   Cardiovascular:      Rate and Rhythm: Normal rate and regular rhythm.      Heart sounds: Normal heart sounds. No murmur heard.  Pulmonary:      Effort: Pulmonary effort is normal. No " respiratory distress.      Breath sounds: Normal breath sounds. No wheezing.   Lymphadenopathy:      Cervical: No cervical adenopathy.   Neurological:      Mental Status: She is alert and oriented to person, place, and time.   Psychiatric:         Mood and Affect: Mood normal. Affect is tearful.         Behavior: Behavior normal.         Lab Results   Component Value Date    WBC 5.3 04/19/2021    HGB 11.6 (L) 04/19/2021    HCT 36.0 04/19/2021     04/19/2021    CHOL 186 08/21/2020    TRIG 100 08/21/2020    HDL 39 (L) 08/21/2020    ALT 16 08/21/2020    AST 15 08/21/2020     08/21/2020    K 4.3 08/21/2020     08/21/2020    CREATININE 1.17 (H) 08/21/2020    BUN 12 08/21/2020    CO2 26 08/21/2020           Assessment   Problem List Items Addressed This Visit        Respiratory    Asthma     Chronic  stable              Mental Health    Anxiety - Primary     Increase in symptoms over last 2 years, incessant worry  GAD7 score 13, PHQ9 0  Has tried to manage symptoms on her own, interested in medication and therapy  Start Lexapro 10 mg daily - Instructed pt to take 1/2 tablet daily x 1 week, then take 1 tablet daily  Establish with therapy - with our office or other provider  Provided education on anxiety management  Follow up 1 month                     DELROY Campbell  8/30/2022

## 2022-09-09 ENCOUNTER — OFFICE VISIT (OUTPATIENT)
Dept: FAMILY MEDICINE | Facility: CLINIC | Age: 32
End: 2022-09-09
Payer: COMMERCIAL

## 2022-09-09 VITALS
SYSTOLIC BLOOD PRESSURE: 127 MMHG | OXYGEN SATURATION: 99 % | HEART RATE: 77 BPM | BODY MASS INDEX: 29.79 KG/M2 | WEIGHT: 174.5 LBS | TEMPERATURE: 98 F | HEIGHT: 64 IN | DIASTOLIC BLOOD PRESSURE: 82 MMHG

## 2022-09-09 DIAGNOSIS — J45.30 MILD PERSISTENT ASTHMA WITHOUT COMPLICATION: Primary | ICD-10-CM

## 2022-09-09 PROCEDURE — 3008F BODY MASS INDEX DOCD: CPT

## 2022-09-09 PROCEDURE — 99213 OFFICE O/P EST LOW 20 MIN: CPT

## 2022-09-09 RX ORDER — TIOTROPIUM BROMIDE INHALATION SPRAY 1.56 UG/1
2 SPRAY, METERED RESPIRATORY (INHALATION) DAILY
Qty: 1 G | Refills: 0 | Status: SHIPPED | OUTPATIENT
Start: 2022-09-09 | End: 2022-10-09

## 2022-09-09 ASSESSMENT — ENCOUNTER SYMPTOMS
SHORTNESS OF BREATH: 0
GASTROINTESTINAL NEGATIVE: 1
NEUROLOGICAL NEGATIVE: 1
CONSTITUTIONAL NEGATIVE: 1
CARDIOVASCULAR NEGATIVE: 1
EYES NEGATIVE: 1
COUGH: 1
CHEST TIGHTNESS: 1

## 2022-09-09 NOTE — PROGRESS NOTES
[facility]      Reason for visit:   Chief Complaint   Patient presents with    Cough      Robyn Mariscal is a 32 y.o. female who presents for cough, asthma.    HPI   Pt reports dry cough since, chest tightness . Feels like her asthma exacerbation.   Usually acts up around October/winter months, allergic to ragweed.  She contacted her asthma/allergist doctor and was unable to get in til October, would not prescribe her medication.  Has been using her albuterol nebulizer twice a day with some help. She ran out of her previous inhaler, per her asthma action plan she was using Spiriva respimat daily.    Had 2 negative covid tests  Denies fever/chills, congestion, sore throat, chest pain, shortness of breath or difficult breathing.  No sick contacts    Never hospitalized for her asthma      Past Medical History:   Diagnosis Date    Anemia     Asthma     COVID-19 2/10/2021    Migraines        Past Surgical History:   Procedure Laterality Date     SECTION      x 3    OVARIAN CYST REMOVAL         Social History     Tobacco Use    Smoking status: Never Smoker    Smokeless tobacco: Never Used   Vaping Use    Vaping Use: Never used   Substance Use Topics    Alcohol use: Yes     Comment: 0-1/week    Drug use: No       Family History   Problem Relation Age of Onset    Ovarian cancer Maternal Grandmother     Heart disease Maternal Grandfather        No known allergies    Current Outpatient Medications on File Prior to Visit   Medication Sig Dispense Refill    escitalopram (LEXAPRO) 10 mg tablet Take 1 tablet (10 mg total) by mouth daily. 30 tablet 0    topiramate (TOPAMAX) 25 mg tablet Take 25 mg by mouth 2 (two) times a day.      budesonide-formoteroL (SYMBICORT) 160-4.5 mcg/actuation inhaler Inhale 2 puffs 2 (two) times a day.   Rinse mouth with water after use to reduce aftertaste and incidence of candidiasis.   Do not swallow.  For patients not on a ventilator, a spacer is recommended to be used  "with this medication/inhaler.       No current facility-administered medications on file prior to visit.       Review of Systems   Constitutional: Negative.    HENT: Negative.    Eyes: Negative.    Respiratory: Positive for cough and chest tightness. Negative for shortness of breath.    Cardiovascular: Negative.    Gastrointestinal: Negative.    Allergic/Immunologic: Positive for environmental allergies.   Neurological: Negative.        Objective   Vitals:    09/09/22 1133   BP: 127/82   BP Location: Left upper arm   Patient Position: Sitting   Pulse: 77   Temp: 36.7 °C (98 °F)   TempSrc: Oral   SpO2: 99%   Weight: 79.2 kg (174 lb 8 oz)   Height: 1.626 m (5' 4\")     Body mass index is 29.95 kg/m².    Physical Exam  Constitutional:       General: She is not in acute distress.     Appearance: Normal appearance. She is not ill-appearing, toxic-appearing or diaphoretic.   HENT:      Head: Normocephalic.      Right Ear: Tympanic membrane normal.      Left Ear: Tympanic membrane normal.      Nose: Nose normal.      Mouth/Throat:      Mouth: Mucous membranes are moist.      Pharynx: Oropharynx is clear.   Eyes:      Conjunctiva/sclera: Conjunctivae normal.      Pupils: Pupils are equal, round, and reactive to light.   Cardiovascular:      Rate and Rhythm: Normal rate and regular rhythm.      Heart sounds: Normal heart sounds.   Pulmonary:      Effort: Pulmonary effort is normal. No tachypnea or respiratory distress.      Breath sounds: Examination of the right-upper field reveals wheezing. Wheezing present. No rhonchi or rales.      Comments: Expiratory wheeze  Lymphadenopathy:      Cervical: No cervical adenopathy.   Skin:     General: Skin is warm and dry.   Neurological:      Mental Status: She is alert and oriented to person, place, and time.         Lab Results   Component Value Date    WBC 5.3 04/19/2021    HGB 11.6 (L) 04/19/2021    HCT 36.0 04/19/2021     04/19/2021    CHOL 186 08/21/2020    TRIG 100 " 08/21/2020    HDL 39 (L) 08/21/2020    ALT 16 08/21/2020    AST 15 08/21/2020     08/21/2020    K 4.3 08/21/2020     08/21/2020    CREATININE 1.17 (H) 08/21/2020    BUN 12 08/21/2020    CO2 26 08/21/2020           Assessment   Problem List Items Addressed This Visit        Respiratory    Asthma - Primary     No acute distress, intermittent dry cough, feels consistent with asthma/allergy reaction around this time of year  Expiratory wheezing right lung field  Rx sent for Spiriva respimat 2 puffs daily  F/u with asthma/allergist - has appt scheduled for October and is on wait list  May continue albuterol nebulize as needed  ER precautions reviewed  Follow up if symptoms worsen or persist.           Relevant Medications    tiotropium bromide (SPIRIVA RESPIMAT) 1.25 mcg/actuation inhaler              DELROY Campbell  9/9/2022

## 2022-09-09 NOTE — ASSESSMENT & PLAN NOTE
No acute distress, intermittent dry cough, feels consistent with asthma/allergy reaction around this time of year  Expiratory wheezing right lung field  Rx sent for Spiriva respimat 2 puffs daily  F/u with asthma/allergist - has appt scheduled for October and is on wait list  May continue albuterol nebulize as needed  ER precautions reviewed  Follow up if symptoms worsen or persist.

## 2022-09-22 RX ORDER — ESCITALOPRAM OXALATE 10 MG/1
10 TABLET ORAL DAILY
Qty: 90 TABLET | Refills: 0 | OUTPATIENT
Start: 2022-09-22 | End: 2022-12-21

## 2022-09-22 NOTE — TELEPHONE ENCOUNTER
Medicine Refill Request    Last Office: 9/9/2022   Last Consult Visit: Visit date not found  Last Telemedicine Visit: Visit date not found    Next Appointment: 9/29/2022      Current Outpatient Medications:     budesonide-formoteroL (SYMBICORT) 160-4.5 mcg/actuation inhaler, Inhale 2 puffs 2 (two) times a day.  Rinse mouth with water after use to reduce aftertaste and incidence of candidiasis.  Do not swallow. For patients not on a ventilator, a spacer is recommended to be used with this medication/inhaler., Disp: , Rfl:     escitalopram (LEXAPRO) 10 mg tablet, Take 1 tablet (10 mg total) by mouth daily., Disp: 30 tablet, Rfl: 0    tiotropium bromide (SPIRIVA RESPIMAT) 1.25 mcg/actuation inhaler, Inhale 2 puffs daily., Disp: 1 g, Rfl: 0    topiramate (TOPAMAX) 25 mg tablet, Take 25 mg by mouth 2 (two) times a day., Disp: , Rfl:       BP Readings from Last 3 Encounters:   09/09/22 127/82   08/30/22 118/84   07/29/22 139/84       Recent Lab results:  Lab Results   Component Value Date    CHOL 186 08/21/2020   ,   Lab Results   Component Value Date    HDL 39 (L) 08/21/2020   ,   Lab Results   Component Value Date    LDLCALC 126 (H) 08/21/2020   ,   Lab Results   Component Value Date    TRIG 100 08/21/2020        Lab Results   Component Value Date    GLUCOSE 85 08/21/2020   , No results found for: HGBA1C      Lab Results   Component Value Date    CREATININE 1.17 (H) 08/21/2020       No results found for: TSH

## 2022-09-26 RX ORDER — ESCITALOPRAM OXALATE 10 MG/1
10 TABLET ORAL DAILY
Qty: 30 TABLET | Refills: 0 | OUTPATIENT
Start: 2022-09-26 | End: 2022-10-26

## 2022-09-29 ENCOUNTER — OFFICE VISIT (OUTPATIENT)
Dept: FAMILY MEDICINE | Facility: CLINIC | Age: 32
End: 2022-09-29
Payer: COMMERCIAL

## 2022-09-29 VITALS
BODY MASS INDEX: 30.29 KG/M2 | WEIGHT: 177.4 LBS | OXYGEN SATURATION: 98 % | TEMPERATURE: 98.2 F | DIASTOLIC BLOOD PRESSURE: 81 MMHG | HEART RATE: 75 BPM | HEIGHT: 64 IN | SYSTOLIC BLOOD PRESSURE: 119 MMHG

## 2022-09-29 DIAGNOSIS — F41.9 ANXIETY: ICD-10-CM

## 2022-09-29 DIAGNOSIS — Z00.00 PHYSICAL EXAM: Primary | ICD-10-CM

## 2022-09-29 DIAGNOSIS — Z11.4 SCREENING FOR HIV (HUMAN IMMUNODEFICIENCY VIRUS): ICD-10-CM

## 2022-09-29 DIAGNOSIS — Z13.220 SCREENING FOR LIPID DISORDERS: ICD-10-CM

## 2022-09-29 DIAGNOSIS — Z11.59 NEED FOR HEPATITIS C SCREENING TEST: ICD-10-CM

## 2022-09-29 DIAGNOSIS — D50.9 IRON DEFICIENCY ANEMIA, UNSPECIFIED IRON DEFICIENCY ANEMIA TYPE: ICD-10-CM

## 2022-09-29 PROBLEM — U07.1 COVID-19: Status: RESOLVED | Noted: 2021-02-10 | Resolved: 2022-09-29

## 2022-09-29 PROCEDURE — 36415 COLL VENOUS BLD VENIPUNCTURE: CPT

## 2022-09-29 PROCEDURE — 3008F BODY MASS INDEX DOCD: CPT

## 2022-09-29 PROCEDURE — 99395 PREV VISIT EST AGE 18-39: CPT

## 2022-09-29 RX ORDER — ESCITALOPRAM OXALATE 10 MG/1
10 TABLET ORAL DAILY
Qty: 90 TABLET | Refills: 0 | Status: SHIPPED | OUTPATIENT
Start: 2022-09-29 | End: 2022-12-28 | Stop reason: SDUPTHER

## 2022-09-29 RX ORDER — ALBUTEROL SULFATE 90 UG/1
2 INHALANT RESPIRATORY (INHALATION) EVERY 6 HOURS PRN
COMMUNITY
End: 2024-10-28 | Stop reason: SDUPTHER

## 2022-09-29 ASSESSMENT — ENCOUNTER SYMPTOMS
PSYCHIATRIC NEGATIVE: 1
RESPIRATORY NEGATIVE: 1
MUSCULOSKELETAL NEGATIVE: 1
HEMATOLOGIC/LYMPHATIC NEGATIVE: 1
CARDIOVASCULAR NEGATIVE: 1
CONSTITUTIONAL NEGATIVE: 1
ENDOCRINE NEGATIVE: 1
NEUROLOGICAL NEGATIVE: 1
GASTROINTESTINAL NEGATIVE: 1
EYES NEGATIVE: 1

## 2022-09-29 NOTE — ASSESSMENT & PLAN NOTE
Improvement in anxiety symptoms, started Lexapro 1 month ago  Feels good, Less worry, less emotional  Continue Lexapro 10 mg daily  Establish with therapy

## 2022-09-29 NOTE — PROGRESS NOTES
[address] [address]  [phone]       Reason for visit:   Chief Complaint   Patient presents with    Annual Exam      HPI   Robyn Mariscal is a 32 y.o. female who presents for her annual exam.   Concerns today - none    Anxiety  Started Lexapro 10 mg 1 month ago  Feels good, less on edge, less emotional, worrying less  Doesn't feel like on medication  2 days felt some increased anxiety - heart racing, no appetite  Cont think of any triggers, felt fine next day  1st week GI diarrhea side effects but has resolved    Diet - pretty good  Exercise - not at this time     Dentist - yes  Eye - yes, due  Derm - no    Smoke No   Alcohol Yes   Drugs No     Lives with , 4 sons  Work   OBGYN: [facility]   Pap Smear Due No 1-2 years ago, normal per pt  Ever Had Mammogram No     Hep A Due No   HPV Due No   TDAP Due No   Flu Due Yes   Lipids Due Yes   STDs Due No   COVID Vaccination Due No     Specialists: neurology, asthma, gyn   Past Medical History:   Diagnosis Date    Anemia     Asthma     COVID-19 02/10/2021    COVID-19 02/10/2021    Migraines     Nerve entrapment     right knee     Past Surgical History:   Procedure Laterality Date     SECTION      x 3    OVARIAN CYST REMOVAL       Social History     Tobacco Use    Smoking status: Never Smoker    Smokeless tobacco: Never Used   Vaping Use    Vaping Use: Never used   Substance Use Topics    Alcohol use: Not Currently     Comment: 0-1/week    Drug use: No     Family History   Problem Relation Age of Onset    Hyperlipidemia Biological Mother     Hypertension Biological Father     Ovarian cancer Maternal Grandmother     Heart disease Maternal Grandfather     Long QT syndrome Paternal Grandmother     Hypertension Paternal Grandmother     Heart disease Paternal Grandfather     Hypertension Paternal Grandfather      No known allergies  Current Outpatient  "Medications   Medication Sig Dispense Refill    albuterol HFA (VENTOLIN HFA) 90 mcg/actuation inhaler Inhale 2 puffs every 6 (six) hours as needed for wheezing or shortness of breath.      escitalopram (LEXAPRO) 10 mg tablet Take 1 tablet (10 mg total) by mouth daily. 90 tablet 0    tiotropium bromide (SPIRIVA RESPIMAT) 1.25 mcg/actuation inhaler Inhale 2 puffs daily. 1 g 0    topiramate (TOPAMAX) 25 mg tablet Take 25 mg by mouth 2 (two) times a day.       No current facility-administered medications for this visit.       Review of Systems   Constitutional: Negative.    HENT: Negative.    Eyes: Negative.    Respiratory: Negative.    Cardiovascular: Negative.    Gastrointestinal: Negative.    Endocrine: Negative.    Genitourinary: Negative.         LMP 9/7/22   Musculoskeletal: Negative.    Skin: Negative.    Allergic/Immunologic: Positive for environmental allergies.   Neurological: Negative.    Hematological: Negative.    Psychiatric/Behavioral: Negative.      Objective   Vitals:    09/29/22 0758   BP: 119/81   BP Location: Right upper arm   Patient Position: Sitting   Pulse: 75   Temp: 36.8 °C (98.2 °F)   TempSrc: Oral   SpO2: 98%   Weight: 80.5 kg (177 lb 6.4 oz)   Height: 1.626 m (5' 4\")       Physical Exam  Constitutional:       General: She is not in acute distress.     Appearance: Normal appearance. She is not ill-appearing, toxic-appearing or diaphoretic.   HENT:      Head: Normocephalic and atraumatic.      Right Ear: Tympanic membrane normal.      Left Ear: Tympanic membrane normal.      Nose: Nose normal.      Mouth/Throat:      Mouth: Mucous membranes are moist.      Pharynx: Oropharynx is clear.   Eyes:      Extraocular Movements: Extraocular movements intact.      Conjunctiva/sclera: Conjunctivae normal.      Pupils: Pupils are equal, round, and reactive to light.   Neck:      Thyroid: No thyromegaly.   Cardiovascular:      Rate and Rhythm: Normal rate and regular rhythm.      Heart sounds: Normal " heart sounds. No murmur heard.  Pulmonary:      Effort: Pulmonary effort is normal. No respiratory distress.      Breath sounds: Normal breath sounds. No wheezing.   Abdominal:      General: Bowel sounds are normal.      Palpations: Abdomen is soft.      Tenderness: There is no abdominal tenderness.   Musculoskeletal:         General: Normal range of motion.      Cervical back: Normal range of motion and neck supple.      Right lower leg: No edema.      Left lower leg: No edema.   Lymphadenopathy:      Cervical: No cervical adenopathy.   Skin:     General: Skin is warm and dry.   Neurological:      Mental Status: She is alert and oriented to person, place, and time.   Psychiatric:         Mood and Affect: Mood normal.         Behavior: Behavior normal.             Lab Results   Component Value Date    WBC 5.3 04/19/2021    HGB 11.6 (L) 04/19/2021    HCT 36.0 04/19/2021     04/19/2021    CHOL 186 08/21/2020    TRIG 100 08/21/2020    HDL 39 (L) 08/21/2020    ALT 16 08/21/2020    AST 15 08/21/2020     08/21/2020    K 4.3 08/21/2020     08/21/2020    CREATININE 1.17 (H) 08/21/2020    BUN 12 08/21/2020    CO2 26 08/21/2020         Assessment   Problem List Items Addressed This Visit        Hematologic    Iron deficiency anemia     Has been on supplement in the past  Has required transfusion during pregnancy  Craved ice when deficient  No craving at this time. Denies fatigue, sob  Will check labs and address as indicated           Relevant Orders    CBC and Differential    Ferritin    Iron and TIBC       Mental Health    Anxiety     Improvement in anxiety symptoms, started Lexapro 1 month ago  Feels good, Less worry, less emotional  Continue Lexapro 10 mg daily  Establish with therapy             Other Visit Diagnoses     Physical exam    -  Primary  Incorporate aerobic exercise - goal is 150 mins/week (30 mins most days of the week) of moderate intensity exercise. Receive a flu shot annually in the  fall. See the dentist twice a year for routine cleaning. See Dermatology for routine skin checks and protect your skin from the sun. Follow a low-cholesterol diet to lessen the risk of heart disease. Limit alcohol beverages to 5-7 or less per week. Protect your sleep. Wear seatbelt and do not text and drive.    Immunizations - encouraged flu vaccine and PNA vaccine today (hx of asthma). She would like to consider.      Relevant Orders    CBC and Differential    Comprehensive metabolic panel    Screening for lipid disorders        Relevant Orders    Lipid panel    Need for hepatitis C screening test        Relevant Orders    Hepatitis C antibody    Screening for HIV (human immunodeficiency virus)        Relevant Orders    HIV 1,2 AB P24 AG         follow up 6 months or sooner if needed.     DELROY Campbell  9/29/2022

## 2022-09-30 LAB
ALBUMIN SERPL-MCNC: 4.4 G/DL (ref 3.6–5.1)
ALBUMIN/GLOB SERPL: 1.4 (CALC) (ref 1–2.5)
ALP SERPL-CCNC: 58 U/L (ref 31–125)
ALT SERPL-CCNC: 13 U/L (ref 6–29)
AST SERPL-CCNC: 15 U/L (ref 10–30)
BASOPHILS # BLD AUTO: 50 CELLS/UL (ref 0–200)
BASOPHILS NFR BLD AUTO: 0.9 %
BILIRUB SERPL-MCNC: 0.3 MG/DL (ref 0.2–1.2)
BUN SERPL-MCNC: 17 MG/DL (ref 7–25)
BUN/CREAT SERPL: 14 (CALC) (ref 6–22)
CALCIUM SERPL-MCNC: 9.5 MG/DL (ref 8.6–10.2)
CHLORIDE SERPL-SCNC: 104 MMOL/L (ref 98–110)
CHOLEST SERPL-MCNC: 187 MG/DL
CHOLEST/HDLC SERPL: 4.3 (CALC)
CO2 SERPL-SCNC: 25 MMOL/L (ref 20–32)
CREAT SERPL-MCNC: 1.2 MG/DL (ref 0.5–0.97)
EGFRCR SERPLBLD CKD-EPI 2021: 61 ML/MIN/1.73M2
EOSINOPHIL # BLD AUTO: 62 CELLS/UL (ref 15–500)
EOSINOPHIL NFR BLD AUTO: 1.1 %
ERYTHROCYTE [DISTWIDTH] IN BLOOD BY AUTOMATED COUNT: 16.1 % (ref 11–15)
FERRITIN SERPL-MCNC: 3 NG/ML (ref 16–154)
GLOBULIN SER CALC-MCNC: 3.2 G/DL (CALC) (ref 1.9–3.7)
GLUCOSE SERPL-MCNC: 86 MG/DL (ref 65–99)
HCT VFR BLD AUTO: 34.3 % (ref 35–45)
HCV AB S/CO SERPL IA: 0.03
HCV AB SERPL QL IA: NORMAL
HDLC SERPL-MCNC: 44 MG/DL
HGB BLD-MCNC: 10.6 G/DL (ref 11.7–15.5)
HIV 1+2 AB+HIV1 P24 AG SERPL QL IA: NORMAL
IRON SATN MFR SERPL: 6 % (CALC) (ref 16–45)
IRON SERPL-MCNC: 21 MCG/DL (ref 40–190)
LDLC SERPL CALC-MCNC: 122 MG/DL (CALC)
LYMPHOCYTES # BLD AUTO: 1669 CELLS/UL (ref 850–3900)
LYMPHOCYTES NFR BLD AUTO: 29.8 %
MCH RBC QN AUTO: 23.6 PG (ref 27–33)
MCHC RBC AUTO-ENTMCNC: 30.9 G/DL (ref 32–36)
MCV RBC AUTO: 76.4 FL (ref 80–100)
MONOCYTES # BLD AUTO: 347 CELLS/UL (ref 200–950)
MONOCYTES NFR BLD AUTO: 6.2 %
NEUTROPHILS # BLD AUTO: 3472 CELLS/UL (ref 1500–7800)
NEUTROPHILS NFR BLD AUTO: 62 %
NONHDLC SERPL-MCNC: 143 MG/DL (CALC)
PLATELET # BLD AUTO: 341 THOUSAND/UL (ref 140–400)
PMV BLD REES-ECKER: 10.1 FL (ref 7.5–12.5)
POTASSIUM SERPL-SCNC: 4.7 MMOL/L (ref 3.5–5.3)
PROT SERPL-MCNC: 7.6 G/DL (ref 6.1–8.1)
RBC # BLD AUTO: 4.49 MILLION/UL (ref 3.8–5.1)
SODIUM SERPL-SCNC: 138 MMOL/L (ref 135–146)
TIBC SERPL-MCNC: 379 MCG/DL (CALC) (ref 250–450)
TRIGL SERPL-MCNC: 104 MG/DL
WBC # BLD AUTO: 5.6 THOUSAND/UL (ref 3.8–10.8)

## 2022-10-07 ENCOUNTER — TELEPHONE (OUTPATIENT)
Dept: FAMILY MEDICINE | Facility: CLINIC | Age: 32
End: 2022-10-07
Payer: COMMERCIAL

## 2022-10-07 DIAGNOSIS — D50.9 IRON DEFICIENCY ANEMIA, UNSPECIFIED IRON DEFICIENCY ANEMIA TYPE: Primary | ICD-10-CM

## 2022-10-07 NOTE — LETTER
[facility]  [facility] in [address]  [address]  [facility] [address]  Phone:  [phone]  Fax:  [phone]    [facility] Account Number - [id number]  [facility] Account Number - [id number]      Patient:    Robyn Mariscal MRN:  [id number]   [address]    [facility]  :  1990  Sex:  F   Phone: [phone]      INSURANCE PAYOR PLAN GROUP # SUBSCRIBER ID   Primary:   [employer] [id number]  Insurance: N/A  Plan Name: N/A [id number]     Order Date:  Oct 7, 2022               Ambulatory referral to Hematology / Oncology  CPT: REF33   (Order ID: [id number])   Diagnosis:  Iron deficiency anemia, unspecified iron deficiency anemia type (D50.9)   Priority:  Routine                           Authorized by: Carmel Sharma DO   (NPI: [id number])    E-Signature: Carmel Sharma DO                       To schedule radiology appointments with [facility]     call Central Scheduling at [phone]  To schedule PET scans call PET Scheduling [phone]    To schedule Low-Dose CT Scan for Lung Cancer Screening  call [phone]        Lab draws do not require an appointment              HEMATOLOGY-ONCOLOGY    Dr. Laura TAYLOR [facility]    [phone]

## 2022-10-07 NOTE — TELEPHONE ENCOUNTER
Spoke with patient regarding iron deficiency anemia.    In pregnancy seen by hematology and received IV iron as she did not respond to oral iron. Now noting that she is more tired. No pica. Heavy periods.    Refer to hematology. Can start oral iron for now.    Also discussed:   creatinine 1.20 - stable, monitor  LDL cholesterol 122 - limit fried/fast foods & dairy, monitor, + FH of hyperlipidemia

## 2022-10-07 NOTE — TELEPHONE ENCOUNTER
----- Message from Carmel Sharma DO sent at 9/30/2022  4:27 PM EDT -----    ----- Message -----  From: Marianela Denotn Lab Results In  Sent: 9/30/2022   6:02 AM EDT  To: DELROY Amado

## 2022-12-28 ENCOUNTER — OFFICE VISIT (OUTPATIENT)
Dept: FAMILY MEDICINE | Facility: CLINIC | Age: 32
End: 2022-12-28
Payer: COMMERCIAL

## 2022-12-28 VITALS
SYSTOLIC BLOOD PRESSURE: 118 MMHG | BODY MASS INDEX: 31.58 KG/M2 | TEMPERATURE: 97.7 F | HEART RATE: 89 BPM | OXYGEN SATURATION: 99 % | HEIGHT: 64 IN | WEIGHT: 185 LBS | DIASTOLIC BLOOD PRESSURE: 84 MMHG

## 2022-12-28 DIAGNOSIS — F41.1 GAD (GENERALIZED ANXIETY DISORDER): Primary | ICD-10-CM

## 2022-12-28 DIAGNOSIS — F43.21 GRIEF REACTION: ICD-10-CM

## 2022-12-28 DIAGNOSIS — F41.0 PANIC ATTACK: ICD-10-CM

## 2022-12-28 PROCEDURE — 3008F BODY MASS INDEX DOCD: CPT | Performed by: FAMILY MEDICINE

## 2022-12-28 PROCEDURE — 99213 OFFICE O/P EST LOW 20 MIN: CPT | Performed by: FAMILY MEDICINE

## 2022-12-28 RX ORDER — CLONAZEPAM 1 MG/1
1 TABLET ORAL 2 TIMES DAILY PRN
Qty: 20 TABLET | Refills: 0 | Status: SHIPPED | OUTPATIENT
Start: 2022-12-28 | End: 2023-04-11

## 2022-12-28 RX ORDER — ESCITALOPRAM OXALATE 20 MG/1
20 TABLET ORAL DAILY
Qty: 90 TABLET | Refills: 1 | Status: SHIPPED | OUTPATIENT
Start: 2022-12-28 | End: 2023-04-04 | Stop reason: DRUGHIGH

## 2022-12-28 NOTE — PROGRESS NOTES
"HPI:  Robyn Mariscal is an 32 y.o. female presenting for follow-up anxiety. Has noted good benefit from Lexapro.    Would have \"panic attacks\" once every 2 months until recently when grandmother became suddenly ill and . Now have multiple panic attacks per day.     Heart racing, vomiting, headaches.      Allergies:  No known allergies    Past Medical History:   Diagnosis Date   • Anemia    • Asthma    • COVID-19 02/10/2021   • COVID-19 02/10/2021   • Migraines    • Nerve entrapment     right knee       Past Surgical History:   Procedure Laterality Date   •  SECTION      x 3   • OVARIAN CYST REMOVAL         Social History     Tobacco Use   • Smoking status: Never   • Smokeless tobacco: Never   Substance Use Topics   • Alcohol use: Not Currently     Comment: 0-1/week       Current Outpatient Medications on File Prior to Visit   Medication Sig Dispense Refill   • albuterol HFA (VENTOLIN HFA) 90 mcg/actuation inhaler Inhale 2 puffs every 6 (six) hours as needed for wheezing or shortness of breath.     • escitalopram (LEXAPRO) 10 mg tablet Take 1 tablet (10 mg total) by mouth daily. 90 tablet 0   • topiramate (TOPAMAX) 25 mg tablet Take 25 mg by mouth 2 (two) times a day.       No current facility-administered medications on file prior to visit.       Family History   Problem Relation Age of Onset   • Hyperlipidemia Biological Mother    • Hypertension Biological Father    • Ovarian cancer Maternal Grandmother    • Heart disease Maternal Grandfather    • Long QT syndrome Paternal Grandmother    • Hypertension Paternal Grandmother    • Heart disease Paternal Grandfather    • Hypertension Paternal Grandfather        Visit Vitals  /84 (BP Location: Left upper arm, Patient Position: Sitting)   Pulse 89   Temp 36.5 °C (97.7 °F) (Temporal)   Ht 1.626 m (5' 4\")   Wt 83.9 kg (185 lb)   SpO2 99%   BMI 31.76 kg/m²        Physical Exam  Constitutional:       General: She is not in acute distress.     Appearance: " Normal appearance.   Neurological:      Mental Status: She is alert.   Psychiatric:         Thought Content: Thought content normal.         Judgment: Judgment normal.      Comments: tearful           A/P  1. ADRI (generalized anxiety disorder)  Improved with Lexapro, however, due to development of panic attacks will increase from 10 mg to 20 mg  - escitalopram (LEXAPRO) 20 mg tablet; Take 1 tablet (20 mg total) by mouth daily.  Dispense: 90 tablet; Refill: 1    2. Panic attack  Significant increase since death of grandmother, short term/sparinig use of Klonopin  - clonazePAM (KlonoPIN) 1 mg tablet; Take 1 tablet (1 mg total) by mouth 2 (two) times a day as needed for anxiety.  Dispense: 20 tablet; Refill: 0  - escitalopram (LEXAPRO) 20 mg tablet; Take 1 tablet (20 mg total) by mouth daily.  Dispense: 90 tablet; Refill: 1    3. Grief reaction    - clonazePAM (KlonoPIN) 1 mg tablet; Take 1 tablet (1 mg total) by mouth 2 (two) times a day as needed for anxiety.  Dispense: 20 tablet; Refill: 0          Next appointment recommended:  Asked that she send me a status update in a few weeks      The patient (parent) indicates an understanding of the events of today's medical evaluation and agrees to the plan of care.    I have asked the patient (parent) to be on the alert for new or worsening symptoms and to call the office directly or proceed to the nearest ER if such should occur.    Total time spent on day on encounter 25 minutes.

## 2022-12-28 NOTE — PATIENT INSTRUCTIONS
"[facility] ([facility]) provides outpatient behavioral healthcare to women ages 18 and older including individual and group psychotherapy, psychiatry evaluation and medication management, Intensive Outpatient Programming (IOP) and a Partial Hospitalization Program(PHP).  While [facility] serves women across the lifespan, we specialize in the treatment of  mental health. The  period encompasses before, during and after pregnancy or adoption; those who have suffered baby loss or struggled with infertility.       We currently have openings for Psychiatry and Individual Therapy services. All patients will need to receive an evaluation by one of our Clinicians to begin treatment.   We prioritize women in the  period and [facility] employees, and those that are active in [facility] practices across the system.       Please direct patients to contact call [facility] at [phone] to schedule an evaluation appointment for ALL services.       \" Two locations: [facility] at [facility] and [facility] at [facility],   o Only our [place] site sees patients in person as the majority of our services are offered via Telehealth           Treatment Services offered at the [facility] include:   \" Women's Partial Hospitalization Program (PHP) - onsite in [place]   \" Women's Intensive Outpatient Program (IOP) - one is onsite at [place]; all others offered via Telehealth   \" Individual psychotherapy -offered via Telehealth   \" Group Psychotherapy - all groups offered via Telehealth   \" Psychiatric evaluation and medication management - offered via Telehealth     "

## 2023-04-04 ENCOUNTER — OFFICE VISIT (OUTPATIENT)
Dept: FAMILY MEDICINE | Facility: CLINIC | Age: 33
End: 2023-04-04
Payer: COMMERCIAL

## 2023-04-04 VITALS
SYSTOLIC BLOOD PRESSURE: 106 MMHG | WEIGHT: 189.2 LBS | DIASTOLIC BLOOD PRESSURE: 73 MMHG | HEIGHT: 64 IN | TEMPERATURE: 97.8 F | OXYGEN SATURATION: 98 % | BODY MASS INDEX: 32.3 KG/M2 | HEART RATE: 90 BPM

## 2023-04-04 DIAGNOSIS — G43.909 MIGRAINE WITHOUT STATUS MIGRAINOSUS, NOT INTRACTABLE, UNSPECIFIED MIGRAINE TYPE: ICD-10-CM

## 2023-04-04 DIAGNOSIS — J45.30 MILD PERSISTENT ASTHMA WITHOUT COMPLICATION: ICD-10-CM

## 2023-04-04 DIAGNOSIS — R63.5 WEIGHT GAIN: Primary | ICD-10-CM

## 2023-04-04 DIAGNOSIS — F41.9 ANXIETY: ICD-10-CM

## 2023-04-04 PROBLEM — D50.9 MICROCYTIC HYPOCHROMIC ANEMIA: Status: ACTIVE | Noted: 2022-11-03

## 2023-04-04 PROCEDURE — 3008F BODY MASS INDEX DOCD: CPT

## 2023-04-04 PROCEDURE — 99214 OFFICE O/P EST MOD 30 MIN: CPT

## 2023-04-04 PROCEDURE — 36415 COLL VENOUS BLD VENIPUNCTURE: CPT

## 2023-04-04 RX ORDER — TIOTROPIUM BROMIDE INHALATION SPRAY 1.56 UG/1
SPRAY, METERED RESPIRATORY (INHALATION)
COMMUNITY
End: 2023-11-15 | Stop reason: SDUPTHER

## 2023-04-04 RX ORDER — TOPIRAMATE 25 MG/1
25 TABLET ORAL DAILY
Qty: 90 TABLET | Refills: 0 | Status: SHIPPED | OUTPATIENT
Start: 2023-04-04 | End: 2023-05-10 | Stop reason: SDUPTHER

## 2023-04-04 RX ORDER — ESCITALOPRAM OXALATE 10 MG/1
10 TABLET ORAL DAILY
Qty: 90 TABLET | Refills: 1 | Status: SHIPPED | OUTPATIENT
Start: 2023-04-04 | End: 2024-05-14

## 2023-04-04 ASSESSMENT — ENCOUNTER SYMPTOMS
BACK PAIN: 0
HEADACHES: 1
MUSCULOSKELETAL NEGATIVE: 1
CARDIOVASCULAR NEGATIVE: 1
RESPIRATORY NEGATIVE: 1
PSYCHIATRIC NEGATIVE: 1
GASTROINTESTINAL NEGATIVE: 1
EYES NEGATIVE: 1
ENDOCRINE NEGATIVE: 1

## 2023-04-04 NOTE — PROGRESS NOTES
[facility]      Reason for visit:   Chief Complaint   Patient presents with   • Weight Gain      Robyn Mariscal is a 32 y.o. female who presents for follow up visit, concerns with weight gain, headaches.    Pt states she has gained about 25-35 lb over 2 years. States she has tried everything.  Weight watchers, intermittent fasting, different diets. Trying to go to gym but not that consistent.  She also states she has hx of migraines, does have daily headache but not as severe as her migraines. was seen by neurology in the past states she has had normal MRI. She took topamax 25 mg daily for many years that helped with headaches. She also wonders if stopping the topamax contributed to weight gain.  She was started on lexapro, but noted weight gain prior to this.  Her dose of lexapro was increased to 20 mg in Dec 2022 due to increase in anxiety symptoms around her grandmother passing. She would like to go back to 10 mg lexapro.    She is supposed to be taking oral iron supplement, but states she has not been taking consistently, she has appt with gyn to discuss heavy periods.    LMP 3/24/23        Past Medical History:   Diagnosis Date   • Anemia    • Asthma    • COVID-19 02/10/2021   • COVID-19 02/10/2021   • Migraines    • Nerve entrapment     right knee       Past Surgical History:   Procedure Laterality Date   •  SECTION      x 3   • OVARIAN CYST REMOVAL         Social History     Tobacco Use   • Smoking status: Never   • Smokeless tobacco: Never   Vaping Use   • Vaping status: Never Used   Substance Use Topics   • Alcohol use: Not Currently     Comment: 0-1/week   • Drug use: No       Family History   Problem Relation Age of Onset   • Hyperlipidemia Biological Mother    • Hypertension Biological Father    • Ovarian cancer Maternal Grandmother    • Heart disease Maternal Grandfather    • Long QT syndrome Paternal Grandmother    • Hypertension Paternal Grandmother    • Heart disease Paternal Grandfather    •  "Hypertension Paternal Grandfather        No known allergies    Current Outpatient Medications on File Prior to Visit   Medication Sig Dispense Refill   • albuterol HFA (VENTOLIN HFA) 90 mcg/actuation inhaler Inhale 2 puffs every 6 (six) hours as needed for wheezing or shortness of breath.     • clonazePAM (KlonoPIN) 1 mg tablet Take 1 tablet (1 mg total) by mouth 2 (two) times a day as needed for anxiety. 20 tablet 0   • tiotropium bromide (SPIRIVA RESPIMAT) 1.25 mcg/actuation inhaler INHALE 2 PUFFS BY MOUTH EVERY DAY Inhalation for 30       No current facility-administered medications on file prior to visit.       Review of Systems   Constitutional:        Weight gain   HENT: Negative.    Eyes: Negative.    Respiratory: Negative.    Cardiovascular: Negative.    Gastrointestinal: Negative.    Endocrine: Negative.    Genitourinary: Negative.    Musculoskeletal: Negative.  Negative for back pain.   Skin: Negative.    Neurological: Positive for headaches.   Psychiatric/Behavioral: Negative.        Objective   Vitals:    04/04/23 1629   BP: 106/73   BP Location: Left upper arm   Patient Position: Sitting   Pulse: 90   Temp: 36.6 °C (97.8 °F)   TempSrc: Temporal   SpO2: 98%   Weight: 85.8 kg (189 lb 3.2 oz)   Height: 1.626 m (5' 4\")     Body mass index is 32.48 kg/m².    Physical Exam  Constitutional:       General: She is not in acute distress.     Appearance: Normal appearance. She is not ill-appearing.   Neck:      Thyroid: No thyromegaly.   Cardiovascular:      Rate and Rhythm: Normal rate and regular rhythm.      Heart sounds: Normal heart sounds. No murmur heard.  Pulmonary:      Effort: Pulmonary effort is normal. No respiratory distress.      Breath sounds: Normal breath sounds.   Musculoskeletal:      Right lower leg: No edema.      Left lower leg: No edema.   Lymphadenopathy:      Cervical: No cervical adenopathy.   Neurological:      Mental Status: She is alert and oriented to person, place, and time. "             Lab Results   Component Value Date    WBC 5.6 09/29/2022    HGB 10.6 (L) 09/29/2022    HCT 34.3 (L) 09/29/2022     09/29/2022    CHOL 187 09/29/2022    TRIG 104 09/29/2022    HDL 44 (L) 09/29/2022    ALT 13 09/29/2022    AST 15 09/29/2022     09/29/2022    K 4.7 09/29/2022     09/29/2022    CREATININE 1.20 (H) 09/29/2022    BUN 17 09/29/2022    CO2 25 09/29/2022           Assessment   Problem List Items Addressed This Visit        Respiratory    Asthma     Stable, follows with asthma/allergist         Relevant Medications    tiotropium bromide (SPIRIVA RESPIMAT) 1.25 mcg/actuation inhaler       Mental Health    Anxiety     Reduce dose back to Lexapro 10 mg daily, felt well controlled at this dose            Other    Migraine without status migrainosus, not intractable     Will restart pt back on Topamax 25 mg daily  If no response with headaches - will advise her to follow up with neurologist         Relevant Medications    topiramate (TOPAMAX) 25 mg tablet    escitalopram (LEXAPRO) 10 mg tablet   Other Visit Diagnoses     Weight gain    -  Primary  Check TSH  Encouraged diet, exercise  Referral to Comprehensive weight & wellness    Relevant Orders    Ambulatory referral to [facility]    TSH w reflex FT4         follow up 1 month     DELROY Campbell  4/4/2023

## 2023-04-04 NOTE — ASSESSMENT & PLAN NOTE
Will restart pt back on Topamax 25 mg daily  If no response with headaches - will advise her to follow up with neurologist

## 2023-04-05 LAB — TSH SERPL-ACNC: 1.94 MIU/L

## 2023-04-11 DIAGNOSIS — F43.21 GRIEF REACTION: ICD-10-CM

## 2023-04-11 DIAGNOSIS — F41.0 PANIC ATTACK: ICD-10-CM

## 2023-04-11 RX ORDER — CLONAZEPAM 1 MG/1
TABLET ORAL
Qty: 20 TABLET | Refills: 0 | Status: SHIPPED | OUTPATIENT
Start: 2023-04-11 | End: 2023-10-18 | Stop reason: SDUPTHER

## 2023-05-10 ENCOUNTER — OFFICE VISIT (OUTPATIENT)
Dept: FAMILY MEDICINE | Facility: CLINIC | Age: 33
End: 2023-05-10
Payer: COMMERCIAL

## 2023-05-10 VITALS
HEIGHT: 64 IN | DIASTOLIC BLOOD PRESSURE: 90 MMHG | OXYGEN SATURATION: 98 % | HEART RATE: 104 BPM | TEMPERATURE: 98 F | BODY MASS INDEX: 31.28 KG/M2 | SYSTOLIC BLOOD PRESSURE: 119 MMHG | WEIGHT: 183.2 LBS

## 2023-05-10 DIAGNOSIS — J45.30 MILD PERSISTENT ASTHMA WITHOUT COMPLICATION: ICD-10-CM

## 2023-05-10 DIAGNOSIS — G43.909 MIGRAINE WITHOUT STATUS MIGRAINOSUS, NOT INTRACTABLE, UNSPECIFIED MIGRAINE TYPE: Primary | ICD-10-CM

## 2023-05-10 DIAGNOSIS — F41.9 ANXIETY: ICD-10-CM

## 2023-05-10 PROCEDURE — 3008F BODY MASS INDEX DOCD: CPT

## 2023-05-10 PROCEDURE — 99213 OFFICE O/P EST LOW 20 MIN: CPT

## 2023-05-10 RX ORDER — TOPIRAMATE 25 MG/1
25 TABLET ORAL 2 TIMES DAILY
Qty: 180 TABLET | Refills: 0 | Status: SHIPPED | OUTPATIENT
Start: 2023-05-10 | End: 2023-07-19

## 2023-05-10 ASSESSMENT — ENCOUNTER SYMPTOMS
EYES NEGATIVE: 1
GASTROINTESTINAL NEGATIVE: 1
CARDIOVASCULAR NEGATIVE: 1
CONSTITUTIONAL NEGATIVE: 1
HEADACHES: 1
PSYCHIATRIC NEGATIVE: 1
RESPIRATORY NEGATIVE: 1

## 2023-05-10 NOTE — PROGRESS NOTES
[facility]      Reason for visit:   Chief Complaint   Patient presents with   • follow up      Robyn Mariscal is a 33 y.o. female who presents for follow up.    HPI  She was started on Topamax 25 mg, she has been prescribed this by neurology in the past. Has taken twice daily. She has noted improvement in her headaches. Has had 1-2 migraines in past 4 weeks.  She is currently taking Lexapro 10 mg daily for anxiety and feels improvement in symptoms. She refilled her klonopin but is using sparingly, may take 1/2 tablet once a month.   She has had recent flare of her asthma believes due to allergies, was seen at urgent care and took Medrol dosepak. Feeling much better. Using albuterol prn.  No side effects from medications.        Past Medical History:   Diagnosis Date   • Anemia    • Asthma    • COVID-19 02/10/2021   • COVID-19 02/10/2021   • Migraines    • Nerve entrapment     right knee       Past Surgical History:   Procedure Laterality Date   •  SECTION      x 3   • OVARIAN CYST REMOVAL         Social History     Tobacco Use   • Smoking status: Never   • Smokeless tobacco: Never   Vaping Use   • Vaping status: Never Used   Substance Use Topics   • Alcohol use: Not Currently     Comment: 0-1/week   • Drug use: No       Family History   Problem Relation Age of Onset   • Hyperlipidemia Biological Mother    • Hypertension Biological Father    • Ovarian cancer Maternal Grandmother    • Heart disease Maternal Grandfather    • Long QT syndrome Paternal Grandmother    • Hypertension Paternal Grandmother    • Heart disease Paternal Grandfather    • Hypertension Paternal Grandfather        No known allergies    Current Outpatient Medications on File Prior to Visit   Medication Sig Dispense Refill   • albuterol HFA (VENTOLIN HFA) 90 mcg/actuation inhaler Inhale 2 puffs every 6 (six) hours as needed for wheezing or shortness of breath.     • clonazePAM (klonoPIN) 1 mg tablet TAKE 1 TABLET (1 MG TOTAL) BY MOUTH TWO  "TIMES A DAY AS NEEDED FOR ANXIETY. 20 tablet 0   • escitalopram (LEXAPRO) 10 mg tablet Take 1 tablet (10 mg total) by mouth daily. 90 tablet 1   • tiotropium bromide (SPIRIVA RESPIMAT) 1.25 mcg/actuation inhaler INHALE 2 PUFFS BY MOUTH EVERY DAY Inhalation for 30       No current facility-administered medications on file prior to visit.       Review of Systems   Constitutional: Negative.    HENT: Negative.    Eyes: Negative.    Respiratory: Negative.    Cardiovascular: Negative.    Gastrointestinal: Negative.    Allergic/Immunologic: Positive for environmental allergies.   Neurological: Positive for headaches.   Psychiatric/Behavioral: Negative.        Objective   Vitals:    05/10/23 1602   BP: 119/90   BP Location: Left upper arm   Patient Position: Sitting   Pulse: (!) 104   Temp: 36.7 °C (98 °F)   TempSrc: Temporal   SpO2: 98%   Weight: 83.1 kg (183 lb 3.2 oz)   Height: 1.626 m (5' 4\")     Body mass index is 31.45 kg/m².    Physical Exam  Constitutional:       General: She is not in acute distress.     Appearance: Normal appearance. She is not ill-appearing.   HENT:      Head: Normocephalic.   Cardiovascular:      Rate and Rhythm: Normal rate and regular rhythm.      Heart sounds: Normal heart sounds.   Pulmonary:      Effort: Pulmonary effort is normal. No respiratory distress.      Breath sounds: Normal breath sounds. No wheezing.   Neurological:      Mental Status: She is alert and oriented to person, place, and time.   Psychiatric:         Mood and Affect: Mood normal.         Behavior: Behavior normal.             Lab Results   Component Value Date    WBC 5.6 09/29/2022    HGB 10.6 (L) 09/29/2022    HCT 34.3 (L) 09/29/2022     09/29/2022    CHOL 187 09/29/2022    TRIG 104 09/29/2022    HDL 44 (L) 09/29/2022    ALT 13 09/29/2022    AST 15 09/29/2022     09/29/2022    K 4.7 09/29/2022     09/29/2022    CREATININE 1.20 (H) 09/29/2022    BUN 17 09/29/2022    CO2 25 09/29/2022    TSH 1.94 " 04/04/2023           Assessment   Problem List Items Addressed This Visit        Respiratory    Asthma     Stable  Continue spiriva, albuterol prn  Follows with asthma/allergist            Mental Health    Anxiety     Well controlled   Continue Lexapro 10mg daily  Using klonopin prn once/month or less            Other    Migraine without status migrainosus, not intractable - Primary     Improved on Topamax - reviewed prev prescription by neurologist  1-2 per month, prev occurring almost daily  Continue Topamax 25 mg BID  Healthy diet, exercise, hydrate         Relevant Medications    topiramate (TOPAMAX) 25 mg tablet      follow up 4 months for annual physical     EmDELROY Méndez  5/10/2023

## 2023-05-10 NOTE — ASSESSMENT & PLAN NOTE
Improved on Topamax - reviewed prev prescription by neurologist  1-2 per month, prev occurring almost daily  Continue Topamax 25 mg BID  Healthy diet, exercise, hydrate

## 2023-07-19 RX ORDER — TOPIRAMATE 25 MG/1
25 TABLET ORAL 2 TIMES DAILY
Qty: 180 TABLET | Refills: 0 | Status: SHIPPED | OUTPATIENT
Start: 2023-07-19 | End: 2024-01-23

## 2023-10-18 DIAGNOSIS — F41.0 PANIC ATTACK: ICD-10-CM

## 2023-10-18 DIAGNOSIS — F43.21 GRIEF REACTION: ICD-10-CM

## 2023-10-19 RX ORDER — CLONAZEPAM 1 MG/1
1 TABLET ORAL AS NEEDED
Qty: 20 TABLET | Refills: 0 | Status: SHIPPED | OUTPATIENT
Start: 2023-10-19 | End: 2024-01-26 | Stop reason: SDUPTHER

## 2023-11-15 ENCOUNTER — OFFICE VISIT (OUTPATIENT)
Dept: FAMILY MEDICINE | Facility: CLINIC | Age: 33
End: 2023-11-15
Payer: COMMERCIAL

## 2023-11-15 VITALS
TEMPERATURE: 98.7 F | OXYGEN SATURATION: 98 % | DIASTOLIC BLOOD PRESSURE: 92 MMHG | SYSTOLIC BLOOD PRESSURE: 120 MMHG | WEIGHT: 184 LBS | HEART RATE: 106 BPM | HEIGHT: 64 IN | BODY MASS INDEX: 31.41 KG/M2

## 2023-11-15 DIAGNOSIS — M54.50 ACUTE MIDLINE LOW BACK PAIN, UNSPECIFIED WHETHER SCIATICA PRESENT: ICD-10-CM

## 2023-11-15 DIAGNOSIS — J45.30 MILD PERSISTENT ASTHMA WITHOUT COMPLICATION: ICD-10-CM

## 2023-11-15 DIAGNOSIS — J01.90 ACUTE NON-RECURRENT SINUSITIS, UNSPECIFIED LOCATION: Primary | ICD-10-CM

## 2023-11-15 PROCEDURE — 3008F BODY MASS INDEX DOCD: CPT

## 2023-11-15 PROCEDURE — 99213 OFFICE O/P EST LOW 20 MIN: CPT

## 2023-11-15 RX ORDER — AMOXICILLIN AND CLAVULANATE POTASSIUM 875; 125 MG/1; MG/1
TABLET, FILM COATED ORAL
COMMUNITY
Start: 2023-11-11 | End: 2024-05-14 | Stop reason: ALTCHOICE

## 2023-11-15 RX ORDER — PREDNISONE 50 MG/1
50 TABLET ORAL DAILY
Qty: 5 TABLET | Refills: 0 | Status: SHIPPED | OUTPATIENT
Start: 2023-11-15 | End: 2024-05-14 | Stop reason: ALTCHOICE

## 2023-11-15 RX ORDER — TIOTROPIUM BROMIDE INHALATION SPRAY 1.56 UG/1
SPRAY, METERED RESPIRATORY (INHALATION)
Qty: 4 G | Refills: 0 | Status: SHIPPED | OUTPATIENT
Start: 2023-11-15

## 2023-11-15 ASSESSMENT — ENCOUNTER SYMPTOMS
NAUSEA: 0
VOMITING: 0
ABDOMINAL PAIN: 0
COUGH: 1
SHORTNESS OF BREATH: 0
FEVER: 0
FATIGUE: 0
CHILLS: 0
WHEEZING: 1
CHEST TIGHTNESS: 1
SORE THROAT: 0
BACK PAIN: 1

## 2023-11-15 NOTE — PROGRESS NOTES
[facility]      Reason for visit:   Chief Complaint   Patient presents with    sick visit      Robyn Mariscal is a 33 y.o. female who presents for sick visit.  2 weeks ago started with very sore throat for a few days, then cough and ear pain. Cough has persisted. 23 had a  telemedicine appt through her insurance and was prescribed Augmentin- started taking on . Ear pain, sore throat improving.  She has intermittent flares of when cough can feel much worse, some days not bad at all. Occasional wheezing at night, cough worse when laying down.     Coughed hard yesterday and felt significant low back pain down through her legs.   Improving today compared to last night, but worse with movement and getting up from sitting to standing.            Past Medical History:   Diagnosis Date    Anemia     Asthma     COVID-19 02/10/2021    COVID-19 02/10/2021    Migraines     Nerve entrapment     right knee       Past Surgical History:   Procedure Laterality Date     SECTION      x 3    OVARIAN CYST REMOVAL         Social History     Tobacco Use    Smoking status: Never    Smokeless tobacco: Never   Vaping Use    Vaping Use: Never used   Substance Use Topics    Alcohol use: Not Currently     Comment: 0-1/week    Drug use: No       Family History   Problem Relation Age of Onset    Hyperlipidemia Biological Mother     Hypertension Biological Father     Ovarian cancer Maternal Grandmother     Heart disease Maternal Grandfather     Long QT syndrome Paternal Grandmother     Hypertension Paternal Grandmother     Heart disease Paternal Grandfather     Hypertension Paternal Grandfather        No known allergies    Current Outpatient Medications on File Prior to Visit   Medication Sig Dispense Refill    albuterol HFA (VENTOLIN HFA) 90 mcg/actuation inhaler Inhale 2 puffs every 6 (six) hours as needed for wheezing or shortness of breath.      clonazePAM (klonoPIN) 1 mg tablet Take 1 tablet  "(1 mg total) by mouth as needed for anxiety. 20 tablet 0    escitalopram (LEXAPRO) 10 mg tablet Take 1 tablet (10 mg total) by mouth daily. 90 tablet 1    topiramate (TOPAMAX) 25 mg tablet TAKE 1 TABLET BY MOUTH TWICE A  tablet 0    amoxicillin-pot clavulanate (AUGMENTIN) 875-125 mg per tablet        No current facility-administered medications on file prior to visit.       Review of Systems   Constitutional: Negative for chills, fatigue and fever.   HENT: Negative for congestion, ear pain and sore throat.    Respiratory: Positive for cough, chest tightness and wheezing. Negative for shortness of breath.    Cardiovascular: Negative for chest pain.   Gastrointestinal: Negative for abdominal pain, nausea and vomiting.   Musculoskeletal: Positive for back pain.       Objective   Vitals:    11/15/23 1053   BP: (!) 120/92   BP Location: Left upper arm   Patient Position: Sitting   Pulse: (!) 106   Temp: 37.1 °C (98.7 °F)   TempSrc: Oral   SpO2: 98%   Weight: 83.5 kg (184 lb)   Height: 1.626 m (5' 4\")     Body mass index is 31.58 kg/m².    Physical Exam  Constitutional:       General: She is not in acute distress.     Appearance: Normal appearance. She is not ill-appearing.   HENT:      Head: Normocephalic.      Right Ear: Tympanic membrane normal.      Left Ear: Tympanic membrane normal.      Nose: Nose normal.      Mouth/Throat:      Mouth: Mucous membranes are moist.      Pharynx: Oropharynx is clear.   Eyes:      Conjunctiva/sclera: Conjunctivae normal.   Cardiovascular:      Rate and Rhythm: Normal rate and regular rhythm.      Heart sounds: Normal heart sounds.   Pulmonary:      Effort: Pulmonary effort is normal. No respiratory distress.      Breath sounds: No wheezing.      Comments: diminished breath sounds b/l bases  Musculoskeletal:      Lumbar back: No tenderness or bony tenderness. Decreased range of motion. Negative right straight leg raise test and negative left straight leg raise test.      " Comments: Pain with movement   Neurological:      Mental Status: She is alert and oriented to person, place, and time.             Lab Results   Component Value Date    WBC 5.6 09/29/2022    HGB 10.6 (L) 09/29/2022    HCT 34.3 (L) 09/29/2022     09/29/2022    CHOL 187 09/29/2022    TRIG 104 09/29/2022    HDL 44 (L) 09/29/2022    ALT 13 09/29/2022    AST 15 09/29/2022     09/29/2022    K 4.7 09/29/2022     09/29/2022    CREATININE 1.20 (H) 09/29/2022    BUN 17 09/29/2022    CO2 25 09/29/2022    TSH 1.94 04/04/2023           Assessment   Problem List Items Addressed This Visit        Respiratory    Asthma  Lungs are clear but diminished at b/l bases  Suspect asthma flare given recent illness  No respiratory distress, speaking in complete sentences. O2sat 98%  Will treat with prednisone burst  Albuterol prn  Continue Spiriva inhaler  Follows with asthma specialist - would like to establish with someone new     Relevant Medications    tiotropium bromide (SPIRIVA RESPIMAT) 1.25 mcg/actuation inhaler   Other Visit Diagnoses     Acute non-recurrent sinusitis, unspecified location    -  Primary  rx'd Augmentin by telemedicine appt through insurance - continue Augmentin       Acute midline low back pain, unspecified whether sciatica present      Pain with coughing episode yesterday, somewhat improved today  Treating with prednisone for asthma flare as well  Recommend tylenol while taking steroid  Rest, Heating pad          Follow up if symptoms worsen or persist.       DELROY Campbell  11/15/2023

## 2024-01-23 RX ORDER — TOPIRAMATE 25 MG/1
25 TABLET ORAL 2 TIMES DAILY
Qty: 180 TABLET | Refills: 0 | Status: SHIPPED | OUTPATIENT
Start: 2024-01-23 | End: 2024-04-29

## 2024-01-26 DIAGNOSIS — F41.0 PANIC ATTACK: ICD-10-CM

## 2024-01-26 DIAGNOSIS — F43.21 GRIEF REACTION: ICD-10-CM

## 2024-01-26 RX ORDER — CLONAZEPAM 1 MG/1
1 TABLET ORAL AS NEEDED
Qty: 20 TABLET | Refills: 0 | Status: SHIPPED | OUTPATIENT
Start: 2024-01-26 | End: 2024-06-18 | Stop reason: SDUPTHER

## 2024-04-29 RX ORDER — TOPIRAMATE 25 MG/1
25 TABLET ORAL 2 TIMES DAILY
Qty: 180 TABLET | Refills: 0 | Status: SHIPPED | OUTPATIENT
Start: 2024-04-29 | End: 2024-10-28 | Stop reason: SDUPTHER

## 2024-05-14 ENCOUNTER — OFFICE VISIT (OUTPATIENT)
Dept: FAMILY MEDICINE | Facility: CLINIC | Age: 34
End: 2024-05-14
Payer: COMMERCIAL

## 2024-05-14 VITALS
WEIGHT: 187.2 LBS | DIASTOLIC BLOOD PRESSURE: 90 MMHG | HEART RATE: 105 BPM | TEMPERATURE: 98 F | BODY MASS INDEX: 31.96 KG/M2 | OXYGEN SATURATION: 98 % | HEIGHT: 64 IN | SYSTOLIC BLOOD PRESSURE: 124 MMHG

## 2024-05-14 DIAGNOSIS — R53.83 FATIGUE, UNSPECIFIED TYPE: ICD-10-CM

## 2024-05-14 DIAGNOSIS — J45.30 MILD PERSISTENT ASTHMA WITHOUT COMPLICATION: ICD-10-CM

## 2024-05-14 DIAGNOSIS — Z00.00 ANNUAL PHYSICAL EXAM: Primary | ICD-10-CM

## 2024-05-14 DIAGNOSIS — D50.9 IRON DEFICIENCY ANEMIA, UNSPECIFIED IRON DEFICIENCY ANEMIA TYPE: ICD-10-CM

## 2024-05-14 PROCEDURE — 3008F BODY MASS INDEX DOCD: CPT

## 2024-05-14 PROCEDURE — 36415 COLL VENOUS BLD VENIPUNCTURE: CPT

## 2024-05-14 PROCEDURE — 99395 PREV VISIT EST AGE 18-39: CPT

## 2024-05-14 ASSESSMENT — ENCOUNTER SYMPTOMS
ACTIVITY CHANGE: 0
WEAKNESS: 0
FATIGUE: 0
NERVOUS/ANXIOUS: 0
LIGHT-HEADEDNESS: 0
DYSPHORIC MOOD: 0
DIARRHEA: 0
COUGH: 0
COLOR CHANGE: 0
FEVER: 0
SLEEP DISTURBANCE: 0
ARTHRALGIAS: 0
SORE THROAT: 0
CHILLS: 0
NUMBNESS: 0
HEADACHES: 0
POLYDIPSIA: 0
VOMITING: 0
POLYPHAGIA: 0
APPETITE CHANGE: 0
NAUSEA: 0
CHEST TIGHTNESS: 0
PALPITATIONS: 0
SHORTNESS OF BREATH: 0
BRUISES/BLEEDS EASILY: 0
WHEEZING: 0
ABDOMINAL PAIN: 0
DIZZINESS: 0
MYALGIAS: 0
CONSTIPATION: 0
DIFFICULTY URINATING: 0

## 2024-05-14 ASSESSMENT — PATIENT HEALTH QUESTIONNAIRE - PHQ9: SUM OF ALL RESPONSES TO PHQ9 QUESTIONS 1 & 2: 0

## 2024-05-14 NOTE — ASSESSMENT & PLAN NOTE
Likely related to heavy cycles  Continue oral iron - ok to take 3 times per week if not able to tolerate daily. Increase fiber in diet, stool softener if needed  Check labs today, and repeat 5 weeks  See gynecology   If unable to tolerated and iron low will have her follow up with hematology

## 2024-05-14 NOTE — PROGRESS NOTES
[facility]  [address] [place] [id number]  [phone]       Reason for visit:   Chief Complaint   Patient presents with    Annual Exam      HPI   Robyn Mariscal is a 34 y.o. female who presents for her annual exam.     Feels like iron is low - extremely tired, b/l arm muscles feel tired, eating ice constantly  Menstrual cycles are not as heavy but irregular, can occur more frequently  Started taking oral iron last week - she states it can make her not feel great but tolerating well this week  She saw hematology 2022 who recommended oral iron and to see her gyn  Denies bloody stools    Anxiety- taking klonopin very infrequent     Screenings:  Pap - believes UTD within past 3 years  Mammogram - n/a  Colonoscopy - n/a    Immunizations: due covid otherwise UTD    Diet - planning to start healthier eating  Exercise - not currently   Lives with , 4 sons     Past Medical History:   Diagnosis Date    Anemia     Asthma     Migraines     Nerve entrapment     right knee     Past Surgical History:   Procedure Laterality Date     SECTION      x 3    OVARIAN CYST REMOVAL       Social History     Tobacco Use    Smoking status: Never    Smokeless tobacco: Never   Vaping Use    Vaping Use: Never used   Substance Use Topics    Alcohol use: Not Currently    Drug use: No     Family History   Problem Relation Age of Onset    Hyperlipidemia Biological Mother     Hypertension Biological Father     Ovarian cancer Maternal Grandmother     Heart disease Maternal Grandfather     Long QT syndrome Paternal Grandmother     Hypertension Paternal Grandmother     Heart disease Paternal Grandfather     Hypertension Paternal Grandfather      No known allergies  Current Outpatient Medications   Medication Sig Dispense Refill    albuterol HFA (VENTOLIN HFA) 90 mcg/actuation inhaler Inhale 2 puffs every 6 (six) hours as needed for wheezing or shortness of breath.      clonazePAM  "(klonoPIN) 1 mg tablet Take 1 tablet (1 mg total) by mouth as needed for anxiety. 20 tablet 0    tiotropium bromide (SPIRIVA RESPIMAT) 1.25 mcg/actuation inhaler INHALE 2 PUFFS BY MOUTH EVERY DAY Inhalation for 30 4 g 0    topiramate (TOPAMAX) 25 mg tablet TAKE 1 TABLET BY MOUTH TWICE A  tablet 0     No current facility-administered medications for this visit.       Review of Systems   Constitutional:  Negative for activity change, appetite change, chills, fatigue and fever.   HENT:  Negative for congestion and sore throat.    Eyes:  Negative for visual disturbance.   Respiratory:  Negative for cough, chest tightness, shortness of breath and wheezing.    Cardiovascular:  Negative for chest pain, palpitations and leg swelling.   Gastrointestinal:  Negative for abdominal pain, constipation, diarrhea, nausea and vomiting.   Endocrine: Negative for cold intolerance, heat intolerance, polydipsia, polyphagia and polyuria.   Genitourinary:  Negative for difficulty urinating.   Musculoskeletal:  Negative for arthralgias and myalgias.   Skin:  Negative for color change and rash.   Neurological:  Negative for dizziness, syncope, weakness, light-headedness, numbness and headaches.   Hematological:  Does not bruise/bleed easily.   Psychiatric/Behavioral:  Negative for dysphoric mood and sleep disturbance. The patient is not nervous/anxious.      Objective   Vitals:    05/14/24 0952   BP: (!) 124/90   BP Location: Left upper arm   Patient Position: Sitting   Pulse: (!) 105   Temp: 36.7 °C (98 °F)   TempSrc: Oral   SpO2: 98%   Weight: 84.9 kg (187 lb 3.2 oz)   Height: 1.626 m (5' 4\")       Physical Exam  Constitutional:       General: She is not in acute distress.     Appearance: Normal appearance. She is not ill-appearing, toxic-appearing or diaphoretic.   HENT:      Head: Normocephalic and atraumatic.      Right Ear: Tympanic membrane normal.      Left Ear: Tympanic membrane normal.      Nose: Nose normal.      " Mouth/Throat:      Mouth: Mucous membranes are moist.      Pharynx: Oropharynx is clear.   Eyes:      Extraocular Movements: Extraocular movements intact.      Conjunctiva/sclera: Conjunctivae normal.      Pupils: Pupils are equal, round, and reactive to light.   Neck:      Thyroid: No thyromegaly.   Cardiovascular:      Rate and Rhythm: Normal rate and regular rhythm.      Heart sounds: Normal heart sounds. No murmur heard.  Pulmonary:      Effort: Pulmonary effort is normal. No respiratory distress.      Breath sounds: Normal breath sounds. No wheezing.   Abdominal:      General: Bowel sounds are normal.      Palpations: Abdomen is soft.      Tenderness: There is no abdominal tenderness.   Musculoskeletal:         General: Normal range of motion.      Right lower leg: No edema.      Left lower leg: No edema.   Lymphadenopathy:      Cervical: No cervical adenopathy.   Skin:     General: Skin is warm and dry.   Neurological:      Mental Status: She is alert and oriented to person, place, and time.   Psychiatric:         Mood and Affect: Mood normal.         Behavior: Behavior normal.             Lab Results   Component Value Date    WBC 5.6 09/29/2022    HGB 10.6 (L) 09/29/2022    HCT 34.3 (L) 09/29/2022     09/29/2022    CHOL 187 09/29/2022    TRIG 104 09/29/2022    HDL 44 (L) 09/29/2022    ALT 13 09/29/2022    AST 15 09/29/2022     09/29/2022    K 4.7 09/29/2022     09/29/2022    CREATININE 1.20 (H) 09/29/2022    BUN 17 09/29/2022    CO2 25 09/29/2022    TSH 1.94 04/04/2023         Assessment   Problem List Items Addressed This Visit       Asthma     Stable  Follows with asthma specialist          Iron deficiency anemia     Likely related to heavy cycles  Continue oral iron - ok to take 3 times per week if not able to tolerate daily. Increase fiber in diet, stool softener if needed  Check labs today, and repeat 5 weeks  See gynecology   If unable to tolerated and iron low will have her follow up  with hematology         Relevant Orders    CBC and Differential    Ferritin    Iron and TIBC     Other Visit Diagnoses       Annual physical exam    -  Primary  Routine labs  Counseled on general health maintenance, healthy diet and exercise.   Continue with routine dental/eye exams.  Immunizations - declines covid  Screenings - belives pap is UTD encouraged to see gyn      Relevant Orders    Comprehensive metabolic panel    CBC and Differential    Lipid panel    Fatigue, unspecified type        check labs    Relevant Orders    TSH w reflex FT4    BMI 32.0-32.9,adult        encourage healthy diet, exercise           Follow up 1 year for physical or sooner if needed.       DELROY Campbell  5/14/2024

## 2024-05-15 ENCOUNTER — TELEPHONE (OUTPATIENT)
Dept: FAMILY MEDICINE | Facility: CLINIC | Age: 34
End: 2024-05-15
Payer: COMMERCIAL

## 2024-05-15 DIAGNOSIS — D50.9 IRON DEFICIENCY ANEMIA, UNSPECIFIED IRON DEFICIENCY ANEMIA TYPE: Primary | ICD-10-CM

## 2024-05-15 LAB
ALBUMIN SERPL-MCNC: 4.3 G/DL (ref 3.6–5.1)
ALBUMIN/GLOB SERPL: 1.4 (CALC) (ref 1–2.5)
ALP SERPL-CCNC: 66 U/L (ref 31–125)
ALT SERPL-CCNC: 14 U/L (ref 6–29)
AST SERPL-CCNC: 16 U/L (ref 10–30)
BASOPHILS # BLD AUTO: 48 CELLS/UL (ref 0–200)
BASOPHILS NFR BLD AUTO: 0.7 %
BILIRUB SERPL-MCNC: 0.3 MG/DL (ref 0.2–1.2)
BUN SERPL-MCNC: 14 MG/DL (ref 7–25)
BUN/CREAT SERPL: 13 (CALC) (ref 6–22)
CALCIUM SERPL-MCNC: 9.4 MG/DL (ref 8.6–10.2)
CHLORIDE SERPL-SCNC: 105 MMOL/L (ref 98–110)
CHOLEST SERPL-MCNC: 200 MG/DL
CHOLEST/HDLC SERPL: 5 (CALC)
CO2 SERPL-SCNC: 23 MMOL/L (ref 20–32)
CREAT SERPL-MCNC: 1.12 MG/DL (ref 0.5–0.97)
EGFRCR SERPLBLD CKD-EPI 2021: 65 ML/MIN/1.73M2
EOSINOPHIL # BLD AUTO: 90 CELLS/UL (ref 15–500)
EOSINOPHIL NFR BLD AUTO: 1.3 %
ERYTHROCYTE [DISTWIDTH] IN BLOOD BY AUTOMATED COUNT: 18.1 % (ref 11–15)
FERRITIN SERPL-MCNC: 3 NG/ML (ref 16–154)
GLOBULIN SER CALC-MCNC: 3.1 G/DL (CALC) (ref 1.9–3.7)
GLUCOSE SERPL-MCNC: 86 MG/DL (ref 65–99)
HCT VFR BLD AUTO: 33 % (ref 35–45)
HDLC SERPL-MCNC: 40 MG/DL
HGB BLD-MCNC: 9.6 G/DL (ref 11.7–15.5)
IRON SATN MFR SERPL: 17 % (CALC) (ref 16–45)
IRON SERPL-MCNC: 67 MCG/DL (ref 40–190)
LDLC SERPL CALC-MCNC: 139 MG/DL (CALC)
LYMPHOCYTES # BLD AUTO: 1835 CELLS/UL (ref 850–3900)
LYMPHOCYTES NFR BLD AUTO: 26.6 %
MCH RBC QN AUTO: 20.2 PG (ref 27–33)
MCHC RBC AUTO-ENTMCNC: 29.1 G/DL (ref 32–36)
MCV RBC AUTO: 69.3 FL (ref 80–100)
MONOCYTES # BLD AUTO: 393 CELLS/UL (ref 200–950)
MONOCYTES NFR BLD AUTO: 5.7 %
MORPHOLOGY BLD-IMP: ABNORMAL
NEUTROPHILS # BLD AUTO: 4533 CELLS/UL (ref 1500–7800)
NEUTROPHILS NFR BLD AUTO: 65.7 %
NONHDLC SERPL-MCNC: 160 MG/DL (CALC)
PLATELET # BLD AUTO: 477 THOUSAND/UL (ref 140–400)
PMV BLD REES-ECKER: 9.8 FL (ref 7.5–12.5)
POTASSIUM SERPL-SCNC: 4.3 MMOL/L (ref 3.5–5.3)
PROT SERPL-MCNC: 7.4 G/DL (ref 6.1–8.1)
RBC # BLD AUTO: 4.76 MILLION/UL (ref 3.8–5.1)
SODIUM SERPL-SCNC: 138 MMOL/L (ref 135–146)
TIBC SERPL-MCNC: 394 MCG/DL (CALC) (ref 250–450)
TRIGL SERPL-MCNC: 101 MG/DL
TSH SERPL-ACNC: 2.21 MIU/L
WBC # BLD AUTO: 6.9 THOUSAND/UL (ref 3.8–10.8)

## 2024-05-16 NOTE — TELEPHONE ENCOUNTER
Returned call to pt.  Iron def anemia - she started oral iron last week - continue daily iron. Advised if not able to tolerate ok to do every other day.  Menstrual cycles have been heavy/more frequent - advised to see her gynecologist.  Will have her do FIT test.  Repeat labs 5 weeks.    Cholesterol elevated- encouraged healthy diet, increasing aerobic exercise  Repeat LP 1 yr.

## 2024-06-18 DIAGNOSIS — F43.21 GRIEF REACTION: ICD-10-CM

## 2024-06-18 DIAGNOSIS — F41.0 PANIC ATTACK: ICD-10-CM

## 2024-06-18 RX ORDER — CLONAZEPAM 1 MG/1
1 TABLET ORAL AS NEEDED
Qty: 20 TABLET | Refills: 0 | Status: SHIPPED | OUTPATIENT
Start: 2024-06-18 | End: 2024-10-28 | Stop reason: SDUPTHER

## 2024-10-28 DIAGNOSIS — F43.21 GRIEF REACTION: ICD-10-CM

## 2024-10-28 DIAGNOSIS — F41.0 PANIC ATTACK: ICD-10-CM

## 2024-10-28 DIAGNOSIS — J45.30 MILD PERSISTENT ASTHMA WITHOUT COMPLICATION: Primary | ICD-10-CM

## 2024-10-28 RX ORDER — TOPIRAMATE 25 MG/1
25 TABLET ORAL 2 TIMES DAILY
Qty: 180 TABLET | Refills: 1 | Status: SHIPPED | OUTPATIENT
Start: 2024-10-28

## 2024-10-28 RX ORDER — CLONAZEPAM 1 MG/1
1 TABLET ORAL AS NEEDED
Qty: 20 TABLET | Refills: 0 | Status: SHIPPED | OUTPATIENT
Start: 2024-10-28

## 2024-10-28 RX ORDER — ALBUTEROL SULFATE 90 UG/1
2 INHALANT RESPIRATORY (INHALATION) EVERY 6 HOURS PRN
Qty: 8.5 G | Refills: 3 | Status: SHIPPED | OUTPATIENT
Start: 2024-10-28 | End: 2024-10-30 | Stop reason: ENTERED-IN-ERROR

## 2024-10-29 NOTE — TELEPHONE ENCOUNTER
PA is required for the Albuterol Inhaler - can you please send another alt? Copied and paste from Insurance     The patient's drug benefit plan provides coverage for other drugs which may be considered for treating your patient. Can your patient be treated with a formulary drug? Available Formulary Alternatives: albuterol sulfate CFC-free aerosol (except NDCs [id number], [id number]), levalbuterol tartrate CFC-free aerosol [NOTE: If yes, provide your patient with a new prescription for the formulary product.]

## 2024-10-30 RX ORDER — ALBUTEROL SULFATE 90 UG/1
2 INHALANT RESPIRATORY (INHALATION) EVERY 6 HOURS PRN
Qty: 8.5 G | Refills: 1 | Status: SHIPPED | OUTPATIENT
Start: 2024-10-30 | End: 2024-11-29